# Patient Record
Sex: MALE | Race: WHITE | NOT HISPANIC OR LATINO | URBAN - METROPOLITAN AREA
[De-identification: names, ages, dates, MRNs, and addresses within clinical notes are randomized per-mention and may not be internally consistent; named-entity substitution may affect disease eponyms.]

---

## 2023-11-28 ENCOUNTER — HOSPITAL ENCOUNTER (OUTPATIENT)
Dept: RADIOLOGY | Facility: HOSPITAL | Age: 63
Discharge: HOME | End: 2023-11-28
Payer: OTHER GOVERNMENT

## 2023-11-28 ENCOUNTER — OFFICE VISIT (OUTPATIENT)
Dept: INTERNAL MEDICINE | Facility: CLINIC | Age: 63
End: 2023-11-28
Payer: OTHER GOVERNMENT

## 2023-11-28 VITALS
HEART RATE: 75 BPM | TEMPERATURE: 98 F | RESPIRATION RATE: 18 BRPM | SYSTOLIC BLOOD PRESSURE: 110 MMHG | WEIGHT: 229.06 LBS | OXYGEN SATURATION: 98 % | HEIGHT: 72 IN | BODY MASS INDEX: 31.03 KG/M2 | DIASTOLIC BLOOD PRESSURE: 78 MMHG

## 2023-11-28 DIAGNOSIS — I10 PRIMARY HYPERTENSION: ICD-10-CM

## 2023-11-28 DIAGNOSIS — M17.0 PRIMARY OSTEOARTHRITIS OF BOTH KNEES: ICD-10-CM

## 2023-11-28 DIAGNOSIS — E78.5 HYPERLIPIDEMIA, UNSPECIFIED HYPERLIPIDEMIA TYPE: ICD-10-CM

## 2023-11-28 DIAGNOSIS — I73.9 CLAUDICATION: ICD-10-CM

## 2023-11-28 DIAGNOSIS — Z00.00 ANNUAL PHYSICAL EXAM: Primary | ICD-10-CM

## 2023-11-28 DIAGNOSIS — Z12.11 COLON CANCER SCREENING: ICD-10-CM

## 2023-11-28 DIAGNOSIS — R73.03 PREDIABETES: ICD-10-CM

## 2023-11-28 DIAGNOSIS — J45.20 MILD INTERMITTENT ASTHMA, UNSPECIFIED WHETHER COMPLICATED: ICD-10-CM

## 2023-11-28 DIAGNOSIS — E66.9 OBESITY (BMI 30-39.9): ICD-10-CM

## 2023-11-28 DIAGNOSIS — Z12.5 PROSTATE CANCER SCREENING: ICD-10-CM

## 2023-11-28 DIAGNOSIS — I25.10 CORONARY ARTERY DISEASE, UNSPECIFIED VESSEL OR LESION TYPE, UNSPECIFIED WHETHER ANGINA PRESENT, UNSPECIFIED WHETHER NATIVE OR TRANSPLANTED HEART: ICD-10-CM

## 2023-11-28 PROCEDURE — 73562 XR KNEE ORTHO BILAT: ICD-10-PCS | Mod: 26,50,, | Performed by: RADIOLOGY

## 2023-11-28 PROCEDURE — 73562 X-RAY EXAM OF KNEE 3: CPT | Mod: 26,50,, | Performed by: RADIOLOGY

## 2023-11-28 PROCEDURE — 99999 PR PBB SHADOW E&M-NEW PATIENT-LVL V: ICD-10-PCS | Mod: PBBFAC,,,

## 2023-11-28 PROCEDURE — 99999 PR PBB SHADOW E&M-NEW PATIENT-LVL V: CPT | Mod: PBBFAC,,,

## 2023-11-28 PROCEDURE — 73562 X-RAY EXAM OF KNEE 3: CPT | Mod: TC,50

## 2023-11-28 PROCEDURE — 99397 PR PREVENTIVE VISIT,EST,65 & OVER: ICD-10-PCS | Mod: S$GLB,,,

## 2023-11-28 PROCEDURE — 99397 PER PM REEVAL EST PAT 65+ YR: CPT | Mod: S$GLB,,,

## 2023-11-28 RX ORDER — ASPIRIN 325 MG
TABLET, DELAYED RELEASE (ENTERIC COATED) ORAL
COMMUNITY

## 2023-11-28 RX ORDER — IBUPROFEN 100 MG/5ML
1000 SUSPENSION, ORAL (FINAL DOSE FORM) ORAL
COMMUNITY

## 2023-11-28 RX ORDER — CHLORTHALIDONE 25 MG/1
25 TABLET ORAL DAILY
Qty: 90 TABLET | Refills: 3 | Status: SHIPPED | OUTPATIENT
Start: 2023-11-28

## 2023-11-28 RX ORDER — FERROUS GLUCONATE 256(28)MG
TABLET ORAL
COMMUNITY

## 2023-11-28 RX ORDER — CETIRIZINE HYDROCHLORIDE 10 MG/1
TABLET ORAL
COMMUNITY
Start: 2023-02-18

## 2023-11-28 RX ORDER — ZINC GLUCONATE 50 MG
50 TABLET ORAL
COMMUNITY
Start: 2018-09-18

## 2023-11-28 RX ORDER — ATORVASTATIN CALCIUM 80 MG/1
80 TABLET, FILM COATED ORAL DAILY
Qty: 90 TABLET | Refills: 3 | Status: SHIPPED | OUTPATIENT
Start: 2023-11-28

## 2023-11-28 RX ORDER — BECLOMETHASONE DIPROPIONATE HFA 80 UG/1
1 AEROSOL, METERED RESPIRATORY (INHALATION) DAILY
Qty: 30.6 G | Refills: 3 | Status: SHIPPED | OUTPATIENT
Start: 2023-11-28

## 2023-11-28 RX ORDER — ATORVASTATIN CALCIUM 80 MG/1
80 TABLET, FILM COATED ORAL
COMMUNITY
End: 2023-11-28 | Stop reason: SDUPTHER

## 2023-11-28 RX ORDER — UBIDECARENONE 30 MG
30 CAPSULE ORAL
COMMUNITY
Start: 2020-10-01

## 2023-11-28 RX ORDER — PNV NO.95/FERROUS FUM/FOLIC AC 28MG-0.8MG
50 TABLET ORAL
COMMUNITY

## 2023-11-28 RX ORDER — CHLORTHALIDONE 25 MG/1
25 TABLET ORAL
COMMUNITY
End: 2023-11-28 | Stop reason: SDUPTHER

## 2023-11-28 NOTE — PROGRESS NOTES
Jonas Mateo  11/28/2023  [id number]    No, Primary Doctor  Patient Care Team:  No, Primary Doctor as PCP - General          Visit Type:an urgent visit for a new problem    Chief Complaint:  Chief Complaint   Patient presents with    Establish Care     Recently moved here. Needs med refills.   Pain in legs to the point of needing to sit down  Would like referral to Ortho  Full panel of blood work to know baseline      History of Present Illness:    The patient presents today for an establish care appointment. It was explained to the patient that I could not be their Primary Care Provider and that I will my have office staff to schedule an establish care appointment with my collaborating physician, Dr. Lala Roa at a later time. The patient was in agreement with scheduling an establish care appointment with  at later time.      Recently moved to the area and needs new PCP     History of Pre-DM  Took metformin before. Has not taken it in years     Has history of asthma/restrictive lung disease  Has not used rescue inhaler in a year  Takes Flovent daily  Insurance would like for him to try a new medicine     Has pain in legs  Has bilateral knee pain  If he sits for 45 mins, pain when he stands    He has pain in his bilateral thighs     Friends is orthopedic surgeon      History:  No past medical history on file.  No past surgical history on file.  No family history on file.  Social History     Socioeconomic History    Marital status:      Social Determinants of Health     Financial Resource Strain: Low Risk  (11/28/2023)    Overall Financial Resource Strain (CARDIA)     Difficulty of Paying Living Expenses: Not hard at all   Food Insecurity: No Food Insecurity (11/28/2023)    Hunger Vital Sign     Worried About Running Out of Food in the Last Year: Never true     Ran Out of Food in the Last Year: Never true   Transportation Needs: No Transportation Needs (11/28/2023)    PRAPARE - Transportation      Lack of Transportation (Medical): No     Lack of Transportation (Non-Medical): No   Physical Activity: Sufficiently Active (11/28/2023)    Exercise Vital Sign     Days of Exercise per Week: 5 days     Minutes of Exercise per Session: 30 min   Stress: No Stress Concern Present (11/28/2023)    [place] of [employer] - Occupational Stress Questionnaire     Feeling of Stress : Not at all   Social Connections: Unknown (11/28/2023)    Social Connection and Isolation Panel [NHANES]     Frequency of Communication with Friends and Family: More than three times a week     Frequency of Social Gatherings with Friends and Family: More than three times a week     Active Member of Clubs or Organizations: No     Attends Club or Organization Meetings: Never     Marital Status:    Housing Stability: Unknown (11/28/2023)    Housing Stability Vital Sign     Number of Places Lived in the Last Year: 2     Unstable Housing in the Last Year: No     There is no problem list on file for this patient.    Review of patient's allergies indicates:  Not on File    The following were reviewed at this visit: active problem list, medication list, allergies, family history, social history, and health maintenance.    Medications:  No current outpatient medications on file prior to visit.     No current facility-administered medications on file prior to visit.       Medications have been reviewed and reconciled with patient at this visit.  Barriers to medications reviewed with patient.    Adverse reactions to current medications reviewed with patient..    Over the counter medications reviewed and reconciled with patient.    Exam:  Wt Readings from Last 3 Encounters:   No data found for Wt     Temp Readings from Last 3 Encounters:   No data found for Temp     BP Readings from Last 3 Encounters:   No data found for BP     Pulse Readings from Last 3 Encounters:   No data found for Pulse     There is no height or weight on file to  "calculate BMI.      Review of Systems   Cardiovascular:  Negative for chest pain and palpitations.     Physical Exam  Vitals and nursing note reviewed.   Constitutional:       General: He is not in acute distress.     Appearance: He is well-developed. He is not diaphoretic.   HENT:      Head: Normocephalic and atraumatic.      Right Ear: External ear normal.      Left Ear: External ear normal.   Eyes:      General:         Right eye: No discharge.         Left eye: No discharge.      Conjunctiva/sclera: Conjunctivae normal.      Pupils: Pupils are equal, round, and reactive to light.   Cardiovascular:      Rate and Rhythm: Normal rate and regular rhythm.      Heart sounds: Normal heart sounds. No murmur heard.  Pulmonary:      Effort: Pulmonary effort is normal. No respiratory distress.      Breath sounds: Normal breath sounds. No wheezing.   Neurological:      Mental Status: He is alert and oriented to person, place, and time.   Psychiatric:         Behavior: Behavior normal.         Thought Content: Thought content normal.         Judgment: Judgment normal.         Laboratory Reviewed ({N/A)  No results found for: "WBC", "HGB", "HCT", "PLT", "CHOL", "TRIG", "HDL", "LDLDIRECT", "ALT", "AST", "NA", "K", "CL", "CREATININE", "BUN", "CO2", "TSH", "PSA", "INR", "GLUF", "HGBA1C", "MICROALBUR"    Jonas was seen today for establish care.    Diagnoses and all orders for this visit:    Annual physical exam  -     CBC Auto Differential; Future  -     Lipid Panel; Future  -     Comprehensive Metabolic Panel; Future    Prostate cancer screening  -     PSA, SCREENING; Future    Prediabetes  -     Hemoglobin A1C; Future    Obesity (BMI 30-39.9)    Mild intermittent asthma, unspecified whether complicated  -     beclomethasone (QVAR REDIHALER) 80 mcg/actuation inhaler; Inhale 1 puff into the lungs once daily.    Primary hypertension  -     chlorthalidone (HYGROTEN) 25 MG Tab; Take 1 tablet (25 mg total) by mouth once " daily.    Hyperlipidemia, unspecified hyperlipidemia type  -     atorvastatin (LIPITOR) 80 MG tablet; Take 1 tablet (80 mg total) by mouth once daily.  -     Ambulatory referral/consult to Cardiology; Future    Colon cancer screening  -     Cologuard Screening (Multitarget Stool DNA); Future  -     Cologuard Screening (Multitarget Stool DNA)    Coronary artery disease, unspecified vessel or lesion type, unspecified whether angina present, unspecified whether native or transplanted heart  -     Ambulatory referral/consult to Cardiology; Future    Claudication  -     Ambulatory referral/consult to Cardiology; Future    Primary osteoarthritis of both knees  -     X-ray Knee Ortho Bilateral; Future  -     Ambulatory referral/consult to Orthopedics; Future      Will schedule an establish care appointment with Dr. Roa in  6 months  Labs today   Refer to ortho and cards       Care Plan/Goals: Reviewed    Goals    None         Follow up: No follow-ups on file.    After visit summary was printed and given to patient upon discharge today.  Patient goals and care plan are included in After Visit Summary.

## 2023-11-29 ENCOUNTER — OFFICE VISIT (OUTPATIENT)
Dept: CARDIOLOGY | Facility: CLINIC | Age: 63
End: 2023-11-29
Payer: OTHER GOVERNMENT

## 2023-11-29 ENCOUNTER — HOSPITAL ENCOUNTER (OUTPATIENT)
Dept: CARDIOLOGY | Facility: HOSPITAL | Age: 63
Discharge: HOME | End: 2023-11-29
Attending: STUDENT IN AN ORGANIZED HEALTH CARE EDUCATION/TRAINING PROGRAM
Payer: OTHER GOVERNMENT

## 2023-11-29 VITALS
BODY MASS INDEX: 31.08 KG/M2 | HEIGHT: 72 IN | HEART RATE: 80 BPM | OXYGEN SATURATION: 97 % | SYSTOLIC BLOOD PRESSURE: 110 MMHG | DIASTOLIC BLOOD PRESSURE: 70 MMHG | WEIGHT: 229.5 LBS

## 2023-11-29 DIAGNOSIS — M25.561 CHRONIC PAIN OF BOTH KNEES: ICD-10-CM

## 2023-11-29 DIAGNOSIS — I25.10 CORONARY ARTERY DISEASE, UNSPECIFIED VESSEL OR LESION TYPE, UNSPECIFIED WHETHER ANGINA PRESENT, UNSPECIFIED WHETHER NATIVE OR TRANSPLANTED HEART: ICD-10-CM

## 2023-11-29 DIAGNOSIS — E66.9 OBESITY (BMI 30.0-34.9): ICD-10-CM

## 2023-11-29 DIAGNOSIS — Z76.89 ENCOUNTER TO ESTABLISH CARE: Primary | ICD-10-CM

## 2023-11-29 DIAGNOSIS — R73.03 PREDIABETES: ICD-10-CM

## 2023-11-29 DIAGNOSIS — Z76.89 ENCOUNTER TO ESTABLISH CARE: ICD-10-CM

## 2023-11-29 DIAGNOSIS — M79.605 PAIN IN BOTH LOWER EXTREMITIES: ICD-10-CM

## 2023-11-29 DIAGNOSIS — I10 HYPERTENSION, UNSPECIFIED TYPE: Primary | ICD-10-CM

## 2023-11-29 DIAGNOSIS — Z00.00 ROUTINE HEALTH MAINTENANCE: ICD-10-CM

## 2023-11-29 DIAGNOSIS — J45.909 ASTHMA, UNSPECIFIED ASTHMA SEVERITY, UNSPECIFIED WHETHER COMPLICATED, UNSPECIFIED WHETHER PERSISTENT: ICD-10-CM

## 2023-11-29 DIAGNOSIS — M79.604 PAIN IN BOTH LOWER EXTREMITIES: ICD-10-CM

## 2023-11-29 DIAGNOSIS — M25.562 CHRONIC PAIN OF BOTH KNEES: ICD-10-CM

## 2023-11-29 DIAGNOSIS — E78.5 HYPERLIPIDEMIA, UNSPECIFIED HYPERLIPIDEMIA TYPE: ICD-10-CM

## 2023-11-29 DIAGNOSIS — G89.29 CHRONIC PAIN OF BOTH KNEES: ICD-10-CM

## 2023-11-29 DIAGNOSIS — I73.9 CLAUDICATION: ICD-10-CM

## 2023-11-29 PROBLEM — E66.811 OBESITY (BMI 30.0-34.9): Status: ACTIVE | Noted: 2023-11-29

## 2023-11-29 PROCEDURE — 99999 PR PBB SHADOW E&M-EST. PATIENT-LVL IV: ICD-10-PCS | Mod: PBBFAC,,, | Performed by: STUDENT IN AN ORGANIZED HEALTH CARE EDUCATION/TRAINING PROGRAM

## 2023-11-29 PROCEDURE — 93010 ELECTROCARDIOGRAM REPORT: CPT | Mod: ,,, | Performed by: STUDENT IN AN ORGANIZED HEALTH CARE EDUCATION/TRAINING PROGRAM

## 2023-11-29 PROCEDURE — 99204 OFFICE O/P NEW MOD 45 MIN: CPT | Mod: S$GLB,,, | Performed by: STUDENT IN AN ORGANIZED HEALTH CARE EDUCATION/TRAINING PROGRAM

## 2023-11-29 PROCEDURE — 99204 PR OFFICE/OUTPT VISIT, NEW, LEVL IV, 45-59 MIN: ICD-10-PCS | Mod: S$GLB,,, | Performed by: STUDENT IN AN ORGANIZED HEALTH CARE EDUCATION/TRAINING PROGRAM

## 2023-11-29 PROCEDURE — 93010 EKG 12-LEAD: ICD-10-PCS | Mod: ,,, | Performed by: STUDENT IN AN ORGANIZED HEALTH CARE EDUCATION/TRAINING PROGRAM

## 2023-11-29 PROCEDURE — 99999 PR PBB SHADOW E&M-EST. PATIENT-LVL IV: CPT | Mod: PBBFAC,,, | Performed by: STUDENT IN AN ORGANIZED HEALTH CARE EDUCATION/TRAINING PROGRAM

## 2023-11-29 PROCEDURE — 93005 ELECTROCARDIOGRAM TRACING: CPT

## 2023-11-29 NOTE — PROGRESS NOTES
Section of Cardiology                  Cardiac Clinic Note    Chief Complaint/Reason for consultation:  leg pain      HPI:   Jonas Galindo is a 63 y.o. male with h/o CAD s/p PCI LAD x2 9/2020, pre diabetes, obesity, hypertension, HLD who was referred to cardiology clinic by ANASTASIA Mariscal for evaluation.    11/29/23  Moved from [place] 6m ago  Has been having claudication for about a few months  When he sitting or walks, hamstrings hurt with radiation to middle thigh  Had normal arterial U/S legs 7/22 was normal   Feels like he may have restless leg   Works form home   Used to exercise, but not much due to knee pain    Has been taking 40 mg of lipitor, prior was 80 mg  Triglycerides are high  Diet has changed since moving to [facility] weight has been stable for 4-5 yrs now    BP stable   Chewed tobacco, stopped 20 years ago  2 beers, glass wine weekly    Family hx: mom- heart disease       EKG 11/29/23 NSR, nonspecific ST wave       ECHO  No results found for this or any previous visit.       STRESS TEST No results found for this or any previous visit.       LHC No results found for this or any previous visit.            ROS: All 10 systems reviewed. Please refer to the HPI for pertinent positives. All other systems negative.     Past Medical History  Past Medical History:   Diagnosis Date    Heart disease     Had 2 stents placed       Surgical History  Past Surgical History:   Procedure Laterality Date    SPINE SURGERY  2015    TOTAL SHOULDER ARTHROPLASTY Right           Allergies:   Review of patient's allergies indicates:   Allergen Reactions    Oseltamivir Palpitations and Rash       Social History:  Social History     Socioeconomic History    Marital status:    Tobacco Use    Smoking status: Never    Smokeless tobacco: Former     Types: Chew   Substance and Sexual Activity    Alcohol use: Not Currently    Drug use: Never     Social Determinants of Health     Financial Resource Strain: Low  Risk  (11/28/2023)    Overall Financial Resource Strain (CARDIA)     Difficulty of Paying Living Expenses: Not hard at all   Food Insecurity: No Food Insecurity (11/28/2023)    Hunger Vital Sign     Worried About Running Out of Food in the Last Year: Never true     Ran Out of Food in the Last Year: Never true   Transportation Needs: No Transportation Needs (11/28/2023)    PRAPARE - Transportation     Lack of Transportation (Medical): No     Lack of Transportation (Non-Medical): No   Physical Activity: Sufficiently Active (11/28/2023)    Exercise Vital Sign     Days of Exercise per Week: 5 days     Minutes of Exercise per Session: 30 min   Stress: No Stress Concern Present (11/28/2023)    [employer] [employer]ational Health - Occupational Stress Questionnaire     Feeling of Stress : Not at all   Social Connections: Unknown (11/28/2023)    Social Connection and Isolation Panel [NHANES]     Frequency of Communication with Friends and Family: More than three times a week     Frequency of Social Gatherings with Friends and Family: More than three times a week     Active Member of Clubs or Organizations: No     Attends Club or Organization Meetings: Never     Marital Status:    Housing Stability: Unknown (11/28/2023)    Housing Stability Vital Sign     Number of Places Lived in the Last Year: 2     Unstable Housing in the Last Year: No       Family History:  family history is not on file.    Home Medications:  Current Outpatient Medications on File Prior to Visit   Medication Sig Dispense Refill    ascorbic acid, vitamin C, (VITAMIN C) 1000 MG tablet Take 1,000 mg by mouth.      aspirin (ASPIR-81 ORAL)       atorvastatin (LIPITOR) 80 MG tablet Take 1 tablet (80 mg total) by mouth once daily. 90 tablet 3    beclomethasone (QVAR REDIHALER) 80 mcg/actuation inhaler Inhale 1 puff into the lungs once daily. 30.6 g 3    cetirizine (ZYRTEC) 10 MG tablet TAKE 1 TABLET BY MOUTH DAILY, THEN TWICE A DAY       "chlorthalidone (HYGROTEN) 25 MG Tab Take 1 tablet (25 mg total) by mouth once daily. 90 tablet 3    cholecalciferol, vitamin D3, 1,250 mcg (50,000 unit) capsule Take by mouth.      co-enzyme Q-10 30 mg capsule Take 30 mg by mouth.      cyanocobalamin (VITAMIN B-12) 100 MCG tablet Take 50 mcg by mouth.      ferrous gluconate 256 mg (28 mg iron) Tab Take by mouth.      FLUTICASONE FUROATE NASL       multivitamin with minerals tablet Take 1 tablet by mouth once daily.      psyllium (HYDROCIL) packet Take 1 packet by mouth once daily.      zinc gluconate 50 mg tablet Take 50 mg by mouth.       No current facility-administered medications on file prior to visit.       Physical exam:  /70 (BP Location: Right arm, Patient Position: Sitting, BP Method: Large (Manual))   Pulse 80   Ht 6' (1.829 m)   Wt 104.1 kg (229 lb 8 oz)   SpO2 97%   BMI 31.13 kg/m²         General: Pt is a 63 y.o. year old male who is AAOx3, in NAD, is pleasant, well nourished, looks stated age  HEENT: PERRL, EOMI, Oral mucosa pink & moist  CVS  No abnormal cardiac pulsations noted on inspection. JVP not raised. The apical impulse is normal on palpation, and is located in the left 5th intercostal space in the mid - clavicular line. No palpable thrills or abnormal pulsations noted. RR, S1 - S2 heard, no murmurs, rubs or gallops appreciated.   PUL : CTA B/L. No wheezes/crackles heard   ABD : BS +, soft. No tenderness elicited   LE : No C/C/E. Distal Pulses palpable B/L         LABS:    Chemistry:   Lab Results   Component Value Date     11/28/2023    K 4.2 11/28/2023     11/28/2023    CO2 30 (H) 11/28/2023    BUN 14 11/28/2023    CREATININE 1.1 11/28/2023    CALCIUM 10.1 11/28/2023     Cardiac Markers: No results found for: "CKTOTAL", "CKMB", "CKMBINDEX", "TROPONINI"  Cardiac Markers (Last 3): No results found for: "CKTOTAL", "CKMB", "CKMBINDEX", "TROPONINI"  CBC:   Lab Results   Component Value Date    WBC 5.25 11/28/2023    HGB " "15.1 11/28/2023    HCT 44.8 11/28/2023    MCV 90 11/28/2023     11/28/2023     Lipids:   Lab Results   Component Value Date    CHOL 178 11/28/2023    TRIG 328 (H) 11/28/2023    HDL 38 (L) 11/28/2023     Coagulation: No results found for: "PT", "INR", "APTT"        Assessment        1. Hypertension, unspecified type    2. Hyperlipidemia, unspecified hyperlipidemia type    3. Coronary artery disease, unspecified vessel or lesion type, unspecified whether angina present, unspecified whether native or transplanted heart    4. Claudication    5. Obesity (BMI 30.0-34.9)    6. Chronic pain of both knees    7. Prediabetes    8. Asthma, unspecified asthma severity, unspecified whether complicated, unspecified whether persistent         Plan:      Leg pain  ?cramps  Will check CK level  Continue lipitor for now  Had arterial u/s 7/22 with no significant blockages in legs, normal ABIs    Hypertension  Stable   Continue chlorthalidone    CAD  s/p PCI LAD 9/2020  Denies chest pain   Continue aspirin, statin    Asthma  Stable   Takes intermittent inhalers    Prediabetes   A1c 5.7   Continue therapy    Knee pain  Will see ortho  Unable to exercise due to knee pain    HLD  LDL 74 as 11/23    - diet and exercise recommended   Continue statin  Recheck in 3 m    Obesity, Body mass index is 31.13 kg/m².   Low salt, low fat diet  Exercise as tolerated, at least 30 min daily     This note was prepared using voice recognition system and is likely to have sound alike errors that may have been overlooked even after proofreading.     I have reviewed all pertinent chart information.  Plans and recommendations have been formulated under my direct supervision. All questions answered and patient voiced understanding.   If symptoms persist go to the ED.    RTC in         Adriana Olmstead MD  Cardiology          "

## 2023-11-30 ENCOUNTER — LAB VISIT (OUTPATIENT)
Dept: LAB | Facility: HOSPITAL | Age: 63
End: 2023-11-30
Attending: STUDENT IN AN ORGANIZED HEALTH CARE EDUCATION/TRAINING PROGRAM
Payer: OTHER GOVERNMENT

## 2023-11-30 DIAGNOSIS — M79.604 PAIN IN BOTH LOWER EXTREMITIES: ICD-10-CM

## 2023-11-30 DIAGNOSIS — M79.605 PAIN IN BOTH LOWER EXTREMITIES: ICD-10-CM

## 2023-11-30 PROCEDURE — 36415 COLL VENOUS BLD VENIPUNCTURE: CPT | Performed by: STUDENT IN AN ORGANIZED HEALTH CARE EDUCATION/TRAINING PROGRAM

## 2023-11-30 PROCEDURE — 82550 ASSAY OF CK (CPK): CPT | Performed by: STUDENT IN AN ORGANIZED HEALTH CARE EDUCATION/TRAINING PROGRAM

## 2023-12-01 LAB — CK SERPL-CCNC: 102 U/L (ref 20–200)

## 2023-12-04 ENCOUNTER — PATIENT MESSAGE (OUTPATIENT)
Dept: INTERNAL MEDICINE | Facility: CLINIC | Age: 63
End: 2023-12-04
Payer: OTHER GOVERNMENT

## 2023-12-18 ENCOUNTER — OFFICE VISIT (OUTPATIENT)
Dept: ORTHOPEDICS | Facility: CLINIC | Age: 63
End: 2023-12-18
Payer: OTHER GOVERNMENT

## 2023-12-18 VITALS
SYSTOLIC BLOOD PRESSURE: 128 MMHG | DIASTOLIC BLOOD PRESSURE: 81 MMHG | WEIGHT: 229 LBS | HEIGHT: 72 IN | BODY MASS INDEX: 31.02 KG/M2 | HEART RATE: 63 BPM

## 2023-12-18 DIAGNOSIS — M17.0 PRIMARY OSTEOARTHRITIS OF BOTH KNEES: Primary | ICD-10-CM

## 2023-12-18 PROCEDURE — 99203 PR OFFICE/OUTPT VISIT, NEW, LEVL III, 30-44 MIN: ICD-10-PCS | Mod: 25,S$GLB,, | Performed by: PHYSICIAN ASSISTANT

## 2023-12-18 PROCEDURE — 99999 PR PBB SHADOW E&M-EST. PATIENT-LVL V: ICD-10-PCS | Mod: PBBFAC,,, | Performed by: PHYSICIAN ASSISTANT

## 2023-12-18 PROCEDURE — 20610 DRAIN/INJ JOINT/BURSA W/O US: CPT | Mod: 50,S$GLB,, | Performed by: PHYSICIAN ASSISTANT

## 2023-12-18 PROCEDURE — 20610 LARGE JOINT ASPIRATION/INJECTION: BILATERAL KNEE: ICD-10-PCS | Mod: 50,S$GLB,, | Performed by: PHYSICIAN ASSISTANT

## 2023-12-18 PROCEDURE — 99203 OFFICE O/P NEW LOW 30 MIN: CPT | Mod: 25,S$GLB,, | Performed by: PHYSICIAN ASSISTANT

## 2023-12-18 PROCEDURE — 99999 PR PBB SHADOW E&M-EST. PATIENT-LVL V: CPT | Mod: PBBFAC,,, | Performed by: PHYSICIAN ASSISTANT

## 2023-12-18 RX ORDER — LIDOCAINE HYDROCHLORIDE 10 MG/ML
5 INJECTION INFILTRATION; PERINEURAL
Status: DISCONTINUED | OUTPATIENT
Start: 2023-12-18 | End: 2023-12-18 | Stop reason: HOSPADM

## 2023-12-18 RX ORDER — METHYLPREDNISOLONE ACETATE 80 MG/ML
80 INJECTION, SUSPENSION INTRA-ARTICULAR; INTRALESIONAL; INTRAMUSCULAR; SOFT TISSUE
Status: DISCONTINUED | OUTPATIENT
Start: 2023-12-18 | End: 2023-12-18 | Stop reason: HOSPADM

## 2023-12-18 RX ADMIN — LIDOCAINE HYDROCHLORIDE 5 ML: 10 INJECTION INFILTRATION; PERINEURAL at 08:12

## 2023-12-18 RX ADMIN — METHYLPREDNISOLONE ACETATE 80 MG: 80 INJECTION, SUSPENSION INTRA-ARTICULAR; INTRALESIONAL; INTRAMUSCULAR; SOFT TISSUE at 08:12

## 2023-12-18 NOTE — PROGRESS NOTES
Patient ID: Jonas Galindo is a 63 y.o. male.    Chief Complaint: Pain and Swelling of the Left Knee and Pain and Swelling of the Right Knee      HPI: Jonas Galindo  is a 63 y.o. male who c/o Pain and Swelling of the Left Knee and Pain and Swelling of the Right Knee       Patient is a new patient who presents to me today with bilateral knee pain, left greater than right.  The patient states pain has been ongoing for roughly 20 years but increasing here over the last year as he has been slightly less active.  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  He states he would prefer not to take any medications is inclined with vitamins.  He states pain increases with use and activity over the course of the day has a hard time walking long distances, going from a sitting to standing or standing to seated position, unlevel terrain or stairs.  He has not using any devices to assist with ambulation.  He does have neoprene knee braces should he need.  He has not had any previous injections into the knees.    He denies any trauma or falls   Patient is presently denying any shortness of breath, chest pain, fever/chills, nausea/vomiting, loss of taste or smell, numbness/tingling or sensation changes, loss of bladder or bowel function.    Past Medical History:   Diagnosis Date    Heart disease     Had 2 stents placed       Past Surgical History:   Procedure Laterality Date    SPINE SURGERY  2015    TOTAL SHOULDER ARTHROPLASTY Right        History reviewed. No pertinent family history.    Social History     Socioeconomic History    Marital status:    Tobacco Use    Smoking status: Never    Smokeless tobacco: Former     Types: Chew   Substance and Sexual Activity    Alcohol use: Not Currently    Drug use: Never     Social Determinants of Health     Financial Resource Strain: Low Risk  (11/28/2023)    Overall Financial Resource Strain (CARDI)     Difficulty of Paying Living  Expenses: Not hard at all   Food Insecurity: No Food Insecurity (11/28/2023)    Hunger Vital Sign     Worried About Running Out of Food in the Last Year: Never true     Ran Out of Food in the Last Year: Never true   Transportation Needs: No Transportation Needs (11/28/2023)    PRAPARE - Transportation     Lack of Transportation (Medical): No     Lack of Transportation (Non-Medical): No   Physical Activity: Sufficiently Active (11/28/2023)    Exercise Vital Sign     Days of Exercise per Week: 5 days     Minutes of Exercise per Session: 30 min   Stress: No Stress Concern Present (11/28/2023)    [place] of Occupational Health - Occupational Stress Questionnaire     Feeling of Stress : Not at all   Social Connections: Unknown (11/28/2023)    Social Connection and Isolation Panel [NHANES]     Frequency of Communication with Friends and Family: More than three times a week     Frequency of Social Gatherings with Friends and Family: More than three times a week     Active Member of Clubs or Organizations: No     Attends Club or Organization Meetings: Never     Marital Status:    Housing Stability: Unknown (11/28/2023)    Housing Stability Vital Sign     Number of Places Lived in the Last Year: 2     Unstable Housing in the Last Year: No       Medication List with Changes/Refills   Current Medications    ASCORBIC ACID, VITAMIN C, (VITAMIN C) 1000 MG TABLET    Take 1,000 mg by mouth.    ASPIRIN (ASPIR-81 ORAL)        ATORVASTATIN (LIPITOR) 80 MG TABLET    Take 1 tablet (80 mg total) by mouth once daily.    BECLOMETHASONE (QVAR REDIHALER) 80 MCG/ACTUATION INHALER    Inhale 1 puff into the lungs once daily.    CETIRIZINE (ZYRTEC) 10 MG TABLET    TAKE 1 TABLET BY MOUTH DAILY, THEN TWICE A DAY    CHLORTHALIDONE (HYGROTEN) 25 MG TAB    Take 1 tablet (25 mg total) by mouth once daily.    CHOLECALCIFEROL, VITAMIN D3, 1,250 MCG (50,000 UNIT) CAPSULE    Take by mouth.    CO-ENZYME Q-10 30 MG CAPSULE    Take 30 mg by  mouth.    CYANOCOBALAMIN (VITAMIN B-12) 100 MCG TABLET    Take 50 mcg by mouth.    FERROUS GLUCONATE 256 MG (28 MG IRON) TAB    Take by mouth.    FLUTICASONE FUROATE NASL        MULTIVITAMIN WITH MINERALS TABLET    Take 1 tablet by mouth once daily.    PSYLLIUM (HYDROCIL) PACKET    Take 1 packet by mouth once daily.    ZINC GLUCONATE 50 MG TABLET    Take 50 mg by mouth.       Review of patient's allergies indicates:   Allergen Reactions    Oseltamivir Palpitations and Rash         Objective:      Lower Extremity      Left KNEE:  ROM: passive flex/ ext full  Patella midling, mild to moderate crepitus noted   Ligaments stable  Pain on palpation to medial and lateral  Calf NT, soft   No defect in the patella or quadriceps tendon  Patella tracks midline no dislocation noted  (-) Dustin sign  DF/PF full  Wiggles toes  Sensation intact to light touch   No pitting edema appreciated   NVI  Cap refill < 2 sec  Skin warm to touch, no obvious lesion noted     Right KNEE:  ROM: passive flex/ ext full  Patella midling, mild to moderate crepitus noted   No defect in the patellar quadriceps tendon  Ligaments stable  Pain on palpation to medial and lateral   Mild edema present  Calf NT, soft  (-) Dustin sign  DF/PF full  Wiggles toes  Sensation intact to light touch   No pitting edema appreciated   NVI  Cap refill < 2 sec  Skin warm to touch, no obvious lesion noted       IMAGING:    XRAY:  FINDINGS:     Right knee, 3 views: Marked medial joint space narrowing.  Tricompartmental marginal spurring lateral tilt of the patella on the sunrise view.  No significant joint effusion.  Nonspecific periarticular calcifications noted adjacent to the medial aspect of the joint.  No fractures or dislocations.  No erosions.     Left knee, 3 views: Marked medial joint space narrowing.  Spurring of the patellofemoral compartment.  Lateral tilt of the patella.  No erosions.  No fractures.        Impression:   Degenerative joint disease with marked  medial joint space narrowing bilaterally.  Lateral tilt of the patella bilaterally.    Kellgren Rambo scale : 3         Assessment:       Encounter Diagnosis   Name Primary?    Primary osteoarthritis of both knees           Plan:       Jonas was seen today for pain, swelling, pain and swelling.    Diagnoses and all orders for this visit:    Primary osteoarthritis of both knees  -     Ambulatory referral/consult to Orthopedics        Jonas Galindo is a new patient who presents to me today with chief complaint of bilateral knee pain, left greater than right. We had a long discussion today regarding degenerative arthritis in the knees. The patient understands that arthritis is chronic and will worsen over time.  The patient also understands that arthritis may cause episodic flare-ups in pain. Management or if arthritis is achieved through a multi-modal approach including weight loss in obese individuals, activity modification, NSAIDs (topical vs oral) where appropriate, periodic intra-articular steroid injections, viscosupplementation, physical therapy, knee bracing, ambulatory aids, as well as geniculate nerve blocks.  I would like to recommend the following vitamins to add to his regimen:  Super B, turmeric, glucosamine and chondroitin.  The patient does have neoprene sleeve should he need he may call if he would prefer bar hinged knee braces for further support.  At this time he would like to hold off on any NSAIDs although we did discuss adding this to his regimen in the future.  He elected to proceed with short-acting steroid injections into both knees today.  Ask that he refrain from soaking in standing water such as a pool or tub for 24 hours.  He may advance activity as tolerated with elevation and ice as needed.  We discussed that these injections may be repeated every 3 months.  I would like to see him back in this timeframe.  If he is doing well he may cancel this appointment.  Furthermore if he is  not he may reach out to our office to add NSAIDs or discuss moving forward with gel injections.    Dr. Menjivar is aware of the patient & current presentation. He agrees with the current plan above.     Patient verbalized understanding of all instructions and agreed with the above plan.    No follow-ups on file.    The patient understands, chooses and consents to this plan and accepts all   the risks which include but are not limited to the risks mentioned above.     Disclaimer: This note was prepared using a voice recognition system and is likely to have sound alike errors within the text.

## 2023-12-18 NOTE — PROCEDURES
Large Joint Aspiration/Injection: bilateral knee    Date/Time: 12/18/2023 8:00 AM    Performed by: Danielle Chadwick PA  Authorized by: Danielle Chadwick PA    Consent Done?:  Yes (Verbal)  Indications:  Arthritis, joint swelling and pain  Site marked: the procedure site was marked      Local anesthesia used?: Yes    Local anesthetic:  Topical anesthetic    Details:  Needle Size:  22 G  Approach:  Anterolateral  Location:  Knee  Laterality:  Bilateral  Site:  Bilateral knee  Medications (Right):  5 mL LIDOcaine HCL 10 mg/ml (1%) 10 mg/mL (1 %); 80 mg methylPREDNISolone acetate 80 mg/mL  Medications (Left):  5 mL LIDOcaine HCL 10 mg/ml (1%) 10 mg/mL (1 %); 80 mg methylPREDNISolone acetate 80 mg/mL  Patient tolerance:  Patient tolerated the procedure well with no immediate complications     Verbal consent was obtained  The patient's ID, site, side was verified  The site was sterile prepped in standard fashion  The injection was performed in the amy-lateral side without complication  A sterile Band-Aid was applied    Patient was directed to apply ice today at roughly 15 minutes at a time as needed.  It was discussed that they may be sore for the next few days or so.  Please avoid strenuous activity over the next 24 hours.  It was also discussed that the patient may have a increase in glucose if diabetic and should monitor levels.  Patient was instructed to call as needed.

## 2024-02-02 ENCOUNTER — PATIENT MESSAGE (OUTPATIENT)
Dept: CARDIOLOGY | Facility: CLINIC | Age: 64
End: 2024-02-02
Payer: OTHER GOVERNMENT

## 2024-02-02 ENCOUNTER — TELEPHONE (OUTPATIENT)
Dept: CARDIOLOGY | Facility: CLINIC | Age: 64
End: 2024-02-02
Payer: OTHER GOVERNMENT

## 2024-02-22 ENCOUNTER — TELEPHONE (OUTPATIENT)
Dept: ORTHOPEDICS | Facility: CLINIC | Age: 64
End: 2024-02-22
Payer: OTHER GOVERNMENT

## 2024-02-22 DIAGNOSIS — M17.0 PRIMARY OSTEOARTHRITIS OF BOTH KNEES: Primary | ICD-10-CM

## 2024-02-22 NOTE — TELEPHONE ENCOUNTER
Called the patient in regards to their appointment at 12:00 with Danielle Chadwick PA-C. Left a message letting the patient know it is to soon for this appointment since they just had an injection in December.

## 2024-02-26 ENCOUNTER — TELEPHONE (OUTPATIENT)
Dept: ORTHOPEDICS | Facility: CLINIC | Age: 64
End: 2024-02-26
Payer: OTHER GOVERNMENT

## 2024-02-26 DIAGNOSIS — M17.0 PRIMARY OSTEOARTHRITIS OF BOTH KNEES: Primary | ICD-10-CM

## 2024-03-13 ENCOUNTER — TELEPHONE (OUTPATIENT)
Dept: ORTHOPEDICS | Facility: CLINIC | Age: 64
End: 2024-03-13
Payer: OTHER GOVERNMENT

## 2024-03-13 NOTE — TELEPHONE ENCOUNTER
Returned the patient's phone call in their message. Patient states that they would like to reschedule their appointment to this week due to fact that they will be out of town next week. Informed the patient that I do not have any appointment's this week for Danielle Chadwick PA-C. Due to the fact that Danielle Farrukh WILKES is in surgery today, her schedule is booked for tomorrow, and she will be out of the office on Friday. Patient states that they will be back in town on next Friday but will be going back out of town on 03/25/24 until the end of the month. I got the patient reschedule for 04/04/24 at 11:45. Verbalized understanding.

## 2024-03-13 NOTE — TELEPHONE ENCOUNTER
----- Message from Laura Connors sent at 3/13/2024 12:29 PM CDT -----  Contact: Jonas Freeman called to corey andrea his 3/18 appt due to work travel. Is there any way he can be seen this week? Please call him back at [phone].    Thanks  TS

## 2024-04-04 ENCOUNTER — PROCEDURE VISIT (OUTPATIENT)
Dept: ORTHOPEDICS | Facility: CLINIC | Age: 64
End: 2024-04-04
Payer: OTHER GOVERNMENT

## 2024-04-04 VITALS — BODY MASS INDEX: 31.03 KG/M2 | HEIGHT: 72 IN | WEIGHT: 229.06 LBS

## 2024-04-04 DIAGNOSIS — M17.0 PRIMARY OSTEOARTHRITIS OF BOTH KNEES: Primary | ICD-10-CM

## 2024-04-04 PROCEDURE — 20610 DRAIN/INJ JOINT/BURSA W/O US: CPT | Mod: 50,S$GLB,, | Performed by: PHYSICIAN ASSISTANT

## 2024-04-04 PROCEDURE — 99499 UNLISTED E&M SERVICE: CPT | Mod: S$GLB,,, | Performed by: PHYSICIAN ASSISTANT

## 2024-04-04 NOTE — PROCEDURES
Large Joint Aspiration/Injection: bilateral knee    Date/Time: 4/4/2024 11:45 AM    Performed by: Danielle Chadwick PA  Authorized by: Danielle Chadwick PA    Consent Done?:  Yes (Verbal)  Indications:  Arthritis, joint swelling and pain  Site marked: the procedure site was marked      Local anesthesia used?: Yes    Local anesthetic:  Topical anesthetic    Details:  Needle Size:  21 G  Approach:  Anterolateral  Location:  Knee  Laterality:  Bilateral  Site:  Bilateral knee  Medications (Right):  60 mg hyaluronate sodium, stabilized (DUROLANE) 60 mg/3 mL  Medications (Left):  60 mg hyaluronate sodium, stabilized (DUROLANE) 60 mg/3 mL  Patient tolerance:  Patient tolerated the procedure well with no immediate complications     Verbal consent was obtained  The patient's ID, site, side was verified  The site was sterile prepped in standard fashion  The injection was performed in the amy-lateral side without complication  A sterile Band-Aid was applied    Patient was directed to apply ice today at roughly 15 minutes at a time as needed.  It was discussed that they may be sore for the next few days or so.  Please avoid strenuous activity over the next 24 hours.  It was also discussed that the patient may have a increase in glucose if diabetic and should monitor levels.  Patient was instructed to call as needed.

## 2024-06-06 ENCOUNTER — OFFICE VISIT (OUTPATIENT)
Dept: INTERNAL MEDICINE | Facility: CLINIC | Age: 64
End: 2024-06-06
Payer: OTHER GOVERNMENT

## 2024-06-06 VITALS
RESPIRATION RATE: 18 BRPM | WEIGHT: 228.38 LBS | TEMPERATURE: 98 F | DIASTOLIC BLOOD PRESSURE: 78 MMHG | BODY MASS INDEX: 30.93 KG/M2 | OXYGEN SATURATION: 98 % | HEIGHT: 72 IN | SYSTOLIC BLOOD PRESSURE: 132 MMHG | HEART RATE: 62 BPM

## 2024-06-06 DIAGNOSIS — R73.03 PREDIABETES: ICD-10-CM

## 2024-06-06 DIAGNOSIS — I25.10 CORONARY ARTERY DISEASE INVOLVING NATIVE CORONARY ARTERY OF NATIVE HEART WITHOUT ANGINA PECTORIS: ICD-10-CM

## 2024-06-06 DIAGNOSIS — E78.5 HYPERLIPIDEMIA, UNSPECIFIED HYPERLIPIDEMIA TYPE: ICD-10-CM

## 2024-06-06 DIAGNOSIS — Z00.00 ENCOUNTER FOR MEDICAL EXAMINATION TO ESTABLISH CARE: Primary | ICD-10-CM

## 2024-06-06 DIAGNOSIS — I10 HYPERTENSION, UNSPECIFIED TYPE: ICD-10-CM

## 2024-06-06 DIAGNOSIS — Z95.820 S/P ANGIOPLASTY WITH STENT: ICD-10-CM

## 2024-06-06 PROBLEM — J44.9 COPD, MILD: Status: ACTIVE | Noted: 2021-01-08

## 2024-06-06 PROBLEM — T46.6X5A STATIN-INDUCED MYOSITIS: Status: ACTIVE | Noted: 2020-10-02

## 2024-06-06 PROBLEM — R35.0 BENIGN PROSTATIC HYPERPLASIA WITH URINARY FREQUENCY: Status: ACTIVE | Noted: 2021-01-08

## 2024-06-06 PROBLEM — M60.9 STATIN-INDUCED MYOSITIS: Status: ACTIVE | Noted: 2020-10-02

## 2024-06-06 PROBLEM — N40.1 BENIGN PROSTATIC HYPERPLASIA WITH URINARY FREQUENCY: Status: ACTIVE | Noted: 2021-01-08

## 2024-06-06 PROBLEM — G89.29 CHRONIC RIGHT SHOULDER PAIN: Status: ACTIVE | Noted: 2020-08-28

## 2024-06-06 PROBLEM — M25.511 CHRONIC RIGHT SHOULDER PAIN: Status: ACTIVE | Noted: 2020-08-28

## 2024-06-06 PROCEDURE — 99999 PR PBB SHADOW E&M-EST. PATIENT-LVL IV: CPT | Mod: PBBFAC,,, | Performed by: FAMILY MEDICINE

## 2024-06-06 PROCEDURE — 99214 OFFICE O/P EST MOD 30 MIN: CPT | Mod: S$GLB,,, | Performed by: FAMILY MEDICINE

## 2024-06-06 PROCEDURE — G2211 COMPLEX E/M VISIT ADD ON: HCPCS | Mod: S$GLB,,, | Performed by: FAMILY MEDICINE

## 2024-06-06 RX ORDER — GLUCOSAM/CHONDRO/HERB 149/HYAL 750-100 MG
TABLET ORAL
COMMUNITY
Start: 2024-01-24

## 2024-06-06 NOTE — PROGRESS NOTES
Jonas Galindo  06/06/2024  [id number]    Lala Roa MD  Patient Care Team:  Lala Roa MD as PCP - General (Family Medicine)          Visit Type:a scheduled routine follow-up visit    Chief Complaint:  Chief Complaint   Patient presents with    Follow-up       History of Present Illness:  Jonas Galindo is a 63 y.o. male with h/o CAD s/p PCI LAD x2 9/2020, pre diabetes, obesity, hypertension, HLD who was   Moved from [place] 1 year ago- saw my HAN, and needs to est care with me.    Had normal arterial U/S legs 7/22 was normal   Feels like he may have restless leg - Cards did CK, normal. Lower dose of the lipitor, Possible RLs?  Works form home   Used to exercise, but not much due to knee pain     Has been taking 40 mg of lipitor, prior was 80 mg  Triglycerides are high  Diet has changed since moving to [place]  Says weight has been stable for 4-5 yrs now     BP stable   Chewed tobacco, stopped 20 years ago  2 beers, glass wine weekly     Family hx: mom- heart disease     PreDM on labs  Did annual labs with HAN  Lab Results   Component Value Date    HGBA1C 5.7 (H) 11/28/2023     He has had cramping, upper abd and then in fingers and toes.  He said he does drink more water.  He does admit his fingers are better.      Health Maintenance Due   Topic Date Due    Hepatitis C Screening  Never done    HIV Screening  Never done    TETANUS VACCINE  Never done    Aspirin/Antiplatelet Therapy  Never done    Shingles Vaccine (1 of 2) Never done    RSV Vaccine (Age 60+ and Pregnant patients) (1 - 1-dose 60+ series) Never done           History:  Past Medical History:   Diagnosis Date    Heart disease     Had 2 stents placed     Past Surgical History:   Procedure Laterality Date    SPINE SURGERY  2015    TOTAL SHOULDER ARTHROPLASTY Right      No family history on file.  Social History     Socioeconomic History    Marital status:    Tobacco Use    Smoking status: Never    Smokeless tobacco: Former     Types:  Chew   Substance and Sexual Activity    Alcohol use: Not Currently    Drug use: Never     Social Determinants of Health     Financial Resource Strain: Low Risk  (11/28/2023)    Overall Financial Resource Strain (CARDIA)     Difficulty of Paying Living Expenses: Not hard at all   Food Insecurity: No Food Insecurity (11/28/2023)    Hunger Vital Sign     Worried About Running Out of Food in the Last Year: Never true     Ran Out of Food in the Last Year: Never true   Transportation Needs: No Transportation Needs (11/28/2023)    PRAPARE - Transportation     Lack of Transportation (Medical): No     Lack of Transportation (Non-Medical): No   Physical Activity: Sufficiently Active (11/28/2023)    Exercise Vital Sign     Days of Exercise per Week: 5 days     Minutes of Exercise per Session: 30 min   Stress: No Stress Concern Present (11/28/2023)    [employer] - Occupational Stress Questionnaire     Feeling of Stress : Not at all   Housing Stability: Unknown (11/28/2023)    Housing Stability Vital Sign     Number of Places Lived in the Last Year: 2     Unstable Housing in the Last Year: No     Patient Active Problem List   Diagnosis    Hyperlipidemia    Claudication    Hypertension    Obesity (BMI 30.0-34.9)    Prediabetes    Asthma    Coronary artery disease involving native coronary artery of native heart without angina pectoris    S/P angioplasty with stent    COPD, mild    Chronic right shoulder pain    Benign essential hypertension    Benign prostatic hyperplasia with urinary frequency    Statin-induced myositis     Review of patient's allergies indicates:   Allergen Reactions    Oseltamivir Palpitations and Rash       The following were reviewed at this visit: active problem list, medication list, allergies, family history, social history, and health maintenance.    Medications:  Current Outpatient Medications on File Prior to Visit   Medication Sig Dispense Refill    aspirin (ASPIR-81  ORAL)       atorvastatin (LIPITOR) 80 MG tablet Take 1 tablet (80 mg total) by mouth once daily. 90 tablet 3    beclomethasone (QVAR REDIHALER) 80 mcg/actuation inhaler Inhale 1 puff into the lungs once daily. 30.6 g 3    cetirizine (ZYRTEC) 10 MG tablet TAKE 1 TABLET BY MOUTH DAILY, THEN TWICE A DAY      chlorthalidone (HYGROTEN) 25 MG Tab Take 1 tablet (25 mg total) by mouth once daily. 90 tablet 3    multivitamin with minerals tablet Take 1 tablet by mouth once daily.      omega 3-dha-epa-fish oil (FISH OIL) 1,000 mg (120 mg-180 mg) Cap       psyllium (HYDROCIL) packet Take 1 packet by mouth once daily.      turmeric, bulk, 95 % Powd       zinc gluconate 50 mg tablet Take 50 mg by mouth.      FLUTICASONE FUROATE NASL  (Patient not taking: Reported on 6/6/2024)      [DISCONTINUED] ascorbic acid, vitamin C, (VITAMIN C) 1000 MG tablet Take 1,000 mg by mouth.      [DISCONTINUED] cholecalciferol, vitamin D3, 1,250 mcg (50,000 unit) capsule Take by mouth.      [DISCONTINUED] co-enzyme Q-10 30 mg capsule Take 30 mg by mouth.      [DISCONTINUED] cyanocobalamin (VITAMIN B-12) 100 MCG tablet Take 50 mcg by mouth.      [DISCONTINUED] ferrous gluconate 256 mg (28 mg iron) Tab Take by mouth.       No current facility-administered medications on file prior to visit.       Medications have been reviewed and reconciled with patient at this visit.  Barriers to medications reviewed with patient.    Adverse reactions to current medications reviewed with patient..    Over the counter medications reviewed and reconciled with patient.    Exam:  Wt Readings from Last 3 Encounters:   06/06/24 103.6 kg (228 lb 6.3 oz)   04/04/24 103.9 kg (229 lb 0.9 oz)   12/18/23 103.9 kg (229 lb)     Temp Readings from Last 3 Encounters:   06/06/24 97.7 °F (36.5 °C) (Tympanic)   11/28/23 97.7 °F (36.5 °C) (Tympanic)     BP Readings from Last 3 Encounters:   06/06/24 132/78   12/18/23 128/81   11/29/23 110/70     Pulse Readings from Last 3 Encounters:    06/06/24 62   12/18/23 63   11/29/23 80     Body mass index is 30.98 kg/m².      Review of Systems   Constitutional: Negative.  Negative for chills and fever.   HENT: Negative.  Negative for congestion, sinus pain and sore throat.    Eyes:  Negative for blurred vision and double vision.   Respiratory:  Negative for cough, sputum production, shortness of breath and wheezing.    Cardiovascular:  Negative for chest pain, palpitations and leg swelling.   Gastrointestinal:  Negative for abdominal pain, constipation, diarrhea, heartburn, nausea and vomiting.   Genitourinary: Negative.    Musculoskeletal:  Positive for joint pain.   Skin: Negative.  Negative for rash.   Neurological: Negative.    Endo/Heme/Allergies: Negative.  Negative for polydipsia. Does not bruise/bleed easily.   Psychiatric/Behavioral:  Negative for depression and substance abuse.      Physical Exam  Nursing note reviewed.   Cardiovascular:      Rate and Rhythm: Normal rate and regular rhythm.   Pulmonary:      Effort: Pulmonary effort is normal. No respiratory distress.   Neurological:      Mental Status: He is alert and oriented to person, place, and time.   Psychiatric:         Mood and Affect: Mood normal.         Behavior: Behavior normal.         Thought Content: Thought content normal.         Judgment: Judgment normal.         Laboratory Reviewed ({Yes)  Lab Results   Component Value Date    WBC 5.25 11/28/2023    HGB 15.1 11/28/2023    HCT 44.8 11/28/2023     11/28/2023    CHOL 178 11/28/2023    TRIG 328 (H) 11/28/2023    HDL 38 (L) 11/28/2023    ALT 27 11/28/2023    AST 37 11/28/2023     11/28/2023    K 4.2 11/28/2023     11/28/2023    CREATININE 1.1 11/28/2023    BUN 14 11/28/2023    CO2 30 (H) 11/28/2023    PSA 3.0 11/28/2023    HGBA1C 5.7 (H) 11/28/2023       Jonas was seen today for follow-up.    Diagnoses and all orders for this visit:    Encounter for medical examination to establish care  HM  reviewed  Hyperlipidemia, unspecified hyperlipidemia type  Lab Results   Component Value Date    LDLCALC 74.4 11/28/2023   CK fine  On statin 40 mg 1/2 80    Hypertension, unspecified type  Coronary artery disease involving native coronary artery of native heart without angina pectoris  S/P angioplasty with stent  BP in goal range  ASA   STATIn  Prediabetes  Noted on labs  REcheck annual   Diet and exercise    Leg Cramps?RLS  I think it more due to electrolytes.  Will try gatarade.  Vascular eval was negative of lower ext.               Care Plan/Goals: Reviewed    Goals    None       Visit today included increased complexity associated with the care of the episodic problem leg pain/cramps , which was addressed while instituting co-management of the longitudinal care of the patient due to the serious and/or complex managed problem(s) .    I have evaluated and discussed management associated with medical care services that serve as the continuing focal point for all needed health care services and/or with medical care services that are part of ongoing care related to my patient's single, serious condition or a complex condition(s).    I am providing ongoing care and I am the primary care provider for this patient, and they are being managed, monitored, and/or observed for their chronic conditions over time.     I have addressed their ongoing health maintenance requirements and needs for all health care services and reviewed co-management plans provided by specialty providers when available.    Health Maintenance Due   Topic Date Due    Hepatitis C Screening  Never done    HIV Screening  Never done    TETANUS VACCINE  Never done    Aspirin/Antiplatelet Therapy  Never done    Shingles Vaccine (1 of 2) Never done    RSV Vaccine (Age 60+ and Pregnant patients) (1 - 1-dose 60+ series) Never done       Follow up: No follow-ups on file.    After visit summary was printed and given to patient upon discharge today.  Patient  goals and care plan are included in After Visit Summary.

## 2024-07-08 ENCOUNTER — PROCEDURE VISIT (OUTPATIENT)
Dept: ORTHOPEDICS | Facility: CLINIC | Age: 64
End: 2024-07-08
Payer: OTHER GOVERNMENT

## 2024-07-08 VITALS
HEIGHT: 72 IN | WEIGHT: 228.38 LBS | BODY MASS INDEX: 30.93 KG/M2 | DIASTOLIC BLOOD PRESSURE: 84 MMHG | HEART RATE: 67 BPM | SYSTOLIC BLOOD PRESSURE: 133 MMHG

## 2024-07-08 DIAGNOSIS — M17.0 PRIMARY OSTEOARTHRITIS OF BOTH KNEES: Primary | ICD-10-CM

## 2024-07-08 RX ORDER — METHYLPREDNISOLONE ACETATE 80 MG/ML
80 INJECTION, SUSPENSION INTRA-ARTICULAR; INTRALESIONAL; INTRAMUSCULAR; SOFT TISSUE
Status: DISCONTINUED | OUTPATIENT
Start: 2024-07-08 | End: 2024-07-08 | Stop reason: HOSPADM

## 2024-07-08 RX ORDER — LIDOCAINE HYDROCHLORIDE 10 MG/ML
5 INJECTION INFILTRATION; PERINEURAL
Status: DISCONTINUED | OUTPATIENT
Start: 2024-07-08 | End: 2024-07-08 | Stop reason: HOSPADM

## 2024-07-08 RX ADMIN — LIDOCAINE HYDROCHLORIDE 5 ML: 10 INJECTION INFILTRATION; PERINEURAL at 10:07

## 2024-07-08 RX ADMIN — METHYLPREDNISOLONE ACETATE 80 MG: 80 INJECTION, SUSPENSION INTRA-ARTICULAR; INTRALESIONAL; INTRAMUSCULAR; SOFT TISSUE at 10:07

## 2024-07-08 NOTE — PROCEDURES
Large Joint Aspiration/Injection: bilateral knee    Date/Time: 7/8/2024 10:45 AM    Performed by: Danielle Chadwick PA  Authorized by: Danielle Chadwick PA    Consent Done?:  Yes (Verbal)  Indications:  Arthritis, joint swelling and pain  Site marked: the procedure site was marked      Local anesthesia used?: Yes    Local anesthetic:  Topical anesthetic    Details:  Needle Size:  21 G  Approach:  Anterolateral  Location:  Knee  Laterality:  Bilateral  Site:  Bilateral knee  Medications (Right):  5 mL LIDOcaine HCL 10 mg/ml (1%) 10 mg/mL (1 %); 80 mg methylPREDNISolone acetate 80 mg/mL  Medications (Left):  5 mL LIDOcaine HCL 10 mg/ml (1%) 10 mg/mL (1 %); 80 mg methylPREDNISolone acetate 80 mg/mL  Patient tolerance:  Patient tolerated the procedure well with no immediate complications     Verbal consent was obtained  The patient's ID, site, side was verified  The site was sterile prepped in standard fashion  The injection was performed in the amy-lateral side without complication  A sterile Band-Aid was applied    Patient was directed to apply ice today at roughly 15 minutes at a time as needed.  It was discussed that they may be sore for the next few days or so.  Please avoid strenuous activity over the next 24 hours.  It was also discussed that the patient may have a increase in glucose if diabetic and should monitor levels.  Patient was instructed to call as needed.

## 2024-08-20 DIAGNOSIS — I25.10 CORONARY ARTERY DISEASE, UNSPECIFIED VESSEL OR LESION TYPE, UNSPECIFIED WHETHER ANGINA PRESENT, UNSPECIFIED WHETHER NATIVE OR TRANSPLANTED HEART: Primary | ICD-10-CM

## 2024-08-28 ENCOUNTER — PATIENT MESSAGE (OUTPATIENT)
Dept: INTERNAL MEDICINE | Facility: CLINIC | Age: 64
End: 2024-08-28
Payer: OTHER GOVERNMENT

## 2024-08-29 ENCOUNTER — OFFICE VISIT (OUTPATIENT)
Dept: INTERNAL MEDICINE | Facility: CLINIC | Age: 64
End: 2024-08-29
Payer: OTHER GOVERNMENT

## 2024-08-29 DIAGNOSIS — I25.10 CORONARY ARTERY DISEASE INVOLVING NATIVE CORONARY ARTERY OF NATIVE HEART WITHOUT ANGINA PECTORIS: ICD-10-CM

## 2024-08-29 DIAGNOSIS — E87.6 HYPOKALEMIA: Primary | ICD-10-CM

## 2024-08-29 DIAGNOSIS — I10 HYPERTENSION, UNSPECIFIED TYPE: ICD-10-CM

## 2024-08-29 PROCEDURE — 99214 OFFICE O/P EST MOD 30 MIN: CPT | Mod: 95,,, | Performed by: FAMILY MEDICINE

## 2024-08-29 RX ORDER — POTASSIUM CHLORIDE 750 MG/1
10 TABLET, EXTENDED RELEASE ORAL
COMMUNITY
Start: 2024-08-28

## 2024-08-29 NOTE — PROGRESS NOTES
"Jonas Galindo  08/29/2024  [id number]    Lala Roa MD  Patient Care Team:  Lala Roa MD as PCP - General (Family Medicine)      The patient location is: home  The chief complaint leading to consultation is: ER    Visit type: audiovisual    Face to Face time with patient: 1  20 minutes of total time spent on the encounter, which includes face to face time and non-face to face time preparing to see the patient (eg, review of tests), Obtaining and/or reviewing separately obtained history, Documenting clinical information in the electronic or other health record, Independently interpreting results (not separately reported) and communicating results to the patient/family/caregiver, or Care coordination (not separately reported).         Each patient to whom he or she provides medical services by telemedicine is:  (1) informed of the relationship between the physician and patient and the respective role of any other health care provider with respect to management of the patient; and (2) notified that he or she may decline to receive medical services by telemedicine and may withdraw from such care at any time.    Notes:      Visit Type: ER follow up    Chief Complaint:Low Potassium    History of Present Illness:    History of Present Illness    CHIEF COMPLAINT:  Mr. Galindo presents today for follow up after an emergency room visit.    EMERGENCY ROOM VISIT AND SYMPTOMS:  He visited [facility] ER in [place] after feeling "weird all over" for about two days. He experienced an uneasy feeling, numbness, tingling, and a slight headache. He initially reported chest pain upon ER admission but clarifies it was brief and quickly resolved. He also noted visual changes, stating his eyes were okay but something seemed different. He denies fever or dehydration prior to symptom onset. Lab results revealed low potassium at 2.9 and slightly low magnesium. He received IV potassium and magnesium, which " alleviated his symptoms. He was discharged with a 14-day prescription for oral potassium supplements and reports feeling stable since then.    MEDICAL HISTORY:  He reports a previous instance of slightly low potassium during an annual checkup in [place] within the past five years, which was not concerning enough to warrant intervention at that time.    MEDICATIONS:  He currently takes chlorothiazide and is on a 14-day course of oral potassium supplements. He expresses a preference to avoid long-term potassium supplementation if possible.    LIFESTYLE:  His job involves frequent travel. He mentions having a lot of travel lately and notes an upcoming trip to [place] in mid-September.    FOLLOW-UP:  He has lab work scheduled for September 20th to check potassium and magnesium levels.        Answers submitted by the patient for this visit:  Review of Systems Questionnaire (Submitted on 8/28/2024)  activity change: No  unexpected weight change: No  rhinorrhea: No  trouble swallowing: No  visual disturbance: Yes  chest tightness: No  polyuria: No  difficulty urinating: No  joint swelling: Yes  arthralgias: Yes  confusion: No  dysphoric mood: No      The following were reviewed at this visit: active problem list, medication list, allergies, family history, social history, and health maintenance.  History:  Past Medical History:   Diagnosis Date    Heart disease     Had 2 stents placed     Past Surgical History:   Procedure Laterality Date    SPINE SURGERY  2015    TOTAL SHOULDER ARTHROPLASTY Right      Patient Active Problem List   Diagnosis    Hyperlipidemia    Claudication    Hypertension    Obesity (BMI 30.0-34.9)    Prediabetes    Asthma    Coronary artery disease involving native coronary artery of native heart without angina pectoris    S/P angioplasty with stent    COPD, mild    Chronic right shoulder pain    Benign essential hypertension    Benign prostatic hyperplasia with urinary frequency    Statin-induced  myositis       Medications:  Current Outpatient Medications on File Prior to Visit   Medication Sig Dispense Refill    potassium chloride SA (K-DUR,KLOR-CON M) 10 MEQ tablet Take 10 mEq by mouth.      aspirin (ASPIR-81 ORAL)       atorvastatin (LIPITOR) 80 MG tablet Take 1 tablet (80 mg total) by mouth once daily. 90 tablet 3    beclomethasone (QVAR REDIHALER) 80 mcg/actuation inhaler Inhale 1 puff into the lungs once daily. 30.6 g 3    cetirizine (ZYRTEC) 10 MG tablet TAKE 1 TABLET BY MOUTH DAILY, THEN TWICE A DAY      chlorthalidone (HYGROTEN) 25 MG Tab Take 1 tablet (25 mg total) by mouth once daily. 90 tablet 3    FLUTICASONE FUROATE NASL       multivitamin with minerals tablet Take 1 tablet by mouth once daily.      omega 3-dha-epa-fish oil (FISH OIL) 1,000 mg (120 mg-180 mg) Cap       psyllium (HYDROCIL) packet Take 1 packet by mouth once daily.      turmeric, bulk, 95 % Powd       zinc gluconate 50 mg tablet Take 50 mg by mouth.       No current facility-administered medications on file prior to visit.       Medications have been reviewed and reconciled with patient at this visit.    Exam:  Wt Readings from Last 3 Encounters:   07/08/24 103.6 kg (228 lb 6.3 oz)   06/06/24 103.6 kg (228 lb 6.3 oz)   04/04/24 103.9 kg (229 lb 0.9 oz)     Temp Readings from Last 3 Encounters:   06/06/24 97.7 °F (36.5 °C) (Tympanic)   11/28/23 97.7 °F (36.5 °C) (Tympanic)     BP Readings from Last 3 Encounters:   07/08/24 133/84   06/06/24 132/78   12/18/23 128/81     Pulse Readings from Last 3 Encounters:   07/08/24 67   06/06/24 62   12/18/23 63     There is no height or weight on file to calculate BMI.      Review of Systems   HENT:  Negative for hearing loss.    Eyes:  Negative for discharge.   Respiratory:  Negative for wheezing.    Cardiovascular:  Positive for chest pain. Negative for palpitations.   Gastrointestinal:  Negative for blood in stool, constipation, diarrhea and vomiting.   Genitourinary:  Negative for  hematuria and urgency.   Musculoskeletal:  Negative for neck pain.   Neurological:  Positive for weakness and headaches.   Endo/Heme/Allergies:  Negative for polydipsia.     Physical Exam  Nursing note reviewed.   Pulmonary:      Effort: Pulmonary effort is normal. No respiratory distress.   Neurological:      Mental Status: He is alert and oriented to person, place, and time.   Psychiatric:         Mood and Affect: Mood normal.         Behavior: Behavior normal.         Thought Content: Thought content normal.         Judgment: Judgment normal.       Physical Exam             Laboratory Reviewed ({Yes)  Lab Results   Component Value Date    WBC 5.25 11/28/2023    HGB 15.1 11/28/2023    HCT 44.8 11/28/2023     11/28/2023    CHOL 178 11/28/2023    TRIG 328 (H) 11/28/2023    HDL 38 (L) 11/28/2023    ALT 27 11/28/2023    AST 37 11/28/2023     11/28/2023    K 4.2 11/28/2023     11/28/2023    CREATININE 1.1 11/28/2023    BUN 14 11/28/2023    CO2 30 (H) 11/28/2023    PSA 3.0 11/28/2023    HGBA1C 5.7 (H) 11/28/2023       Diagnoses and all orders for this visit:    Hypokalemia  -     Basic Metabolic Panel; Future  -     Magnesium; Future    Hypertension, unspecified type    Coronary artery disease involving native coronary artery of native heart without angina pectoris          Assessment & Plan    R53.83 Other fatigue    E83.42 Hypomagnesemia    E87.6 Hypokalemia    Assessed patient's recent ER visit for generalized symptoms including headache, uneasiness, numbness, tingling, and brief chest pain  Reviewed ER findings of low potassium (2.9) and slightly low magnesium  Considered potential causes of electrolyte imbalance, including chlorothiazide use and possible dehydration  Determined current symptoms likely due to low potassium, given improvement after ER treatment  Decided against immediate stress test, as troponin was normal and symptoms improved with electrolyte correction  Plan to reassess need for  further cardiac workup if symptoms recur with normal potassium levels    ELECTROLYTE IMBALANCE:    - Explained that BP med can sometimes lead to electrolyte imbalances, especially in hot weather due to increased insensible losses.  - Discussed that low potassium can cause generalized symptoms like those experienced by the patient.  - Informed that electrolyte imbalances are usually correctable and may not recur if properly managed.  - Mr. Galindo to continue to hydrate adequately.  - Mr. Galindo to maintain a well-balanced diet.  - Continued potassium supplement for 14 days as prescribed by ER.  - Potassium and magnesium levels ordered to be done on September [facility]th at [facility].  MEDICATIONS/SUPPLEMENTS:    FOLLOW UP:  - Follow up on September 20th for ordered labs.  - Provider will review lab results and contact patient with further recommendations.          This note was generated with the assistance of ambient listening technology. Verbal consent was obtained by the patient and accompanying visitor(s) for the recording of patient appointment to facilitate this note. I attest to having reviewed and edited the generated note for accuracy, though some syntax or spelling errors may persist. Please contact the author of this note for any clarification.     Care Plan/Goals: Reviewed     Follow up: No follow-ups on file.    After visit summary was printed and given to patient upon discharge today.  Patient goals and care plan are included in After Visit Summary.

## 2024-09-30 ENCOUNTER — LAB VISIT (OUTPATIENT)
Dept: LAB | Facility: HOSPITAL | Age: 64
End: 2024-09-30
Attending: FAMILY MEDICINE
Payer: OTHER GOVERNMENT

## 2024-09-30 DIAGNOSIS — E87.6 HYPOKALEMIA: ICD-10-CM

## 2024-09-30 PROCEDURE — 36415 COLL VENOUS BLD VENIPUNCTURE: CPT | Mod: PO | Performed by: FAMILY MEDICINE

## 2024-09-30 PROCEDURE — 83735 ASSAY OF MAGNESIUM: CPT | Performed by: FAMILY MEDICINE

## 2024-09-30 PROCEDURE — 80048 BASIC METABOLIC PNL TOTAL CA: CPT | Performed by: FAMILY MEDICINE

## 2024-10-01 LAB
ANION GAP SERPL CALC-SCNC: 7 MMOL/L (ref 8–16)
BUN SERPL-MCNC: 15 MG/DL (ref 8–23)
CALCIUM SERPL-MCNC: 9.7 MG/DL (ref 8.7–10.5)
CHLORIDE SERPL-SCNC: 101 MMOL/L (ref 95–110)
CO2 SERPL-SCNC: 31 MMOL/L (ref 23–29)
CREAT SERPL-MCNC: 1 MG/DL (ref 0.5–1.4)
EST. GFR  (NO RACE VARIABLE): >60 ML/MIN/1.73 M^2
GLUCOSE SERPL-MCNC: 106 MG/DL (ref 70–110)
MAGNESIUM SERPL-MCNC: 1.9 MG/DL (ref 1.6–2.6)
POTASSIUM SERPL-SCNC: 4.2 MMOL/L (ref 3.5–5.1)
SODIUM SERPL-SCNC: 139 MMOL/L (ref 136–145)

## 2024-10-04 ENCOUNTER — PROCEDURE VISIT (OUTPATIENT)
Dept: ORTHOPEDICS | Facility: CLINIC | Age: 64
End: 2024-10-04
Payer: OTHER GOVERNMENT

## 2024-10-04 VITALS — BODY MASS INDEX: 30.93 KG/M2 | WEIGHT: 228.38 LBS | HEIGHT: 72 IN

## 2024-10-04 DIAGNOSIS — M17.0 PRIMARY OSTEOARTHRITIS OF BOTH KNEES: Primary | ICD-10-CM

## 2024-10-04 NOTE — PROCEDURES
Large Joint Aspiration/Injection: bilateral knee    Date/Time: 10/4/2024 9:45 AM    Performed by: Danielle Chadwick PA  Authorized by: Danielle Chadwick PA    Consent Done?:  Yes (Verbal)  Indications:  Arthritis, joint swelling and pain  Site marked: the procedure site was marked      Local anesthesia used?: Yes    Local anesthetic:  Topical anesthetic    Details:  Needle Size:  21 G  Approach:  Anterolateral  Location:  Knee  Laterality:  Bilateral  Site:  Bilateral knee  Medications (Right):  60 mg hyaluronate sodium, stabilized (DUROLANE) 60 mg/3 mL  Medications (Left):  60 mg hyaluronate sodium, stabilized (DUROLANE) 60 mg/3 mL  Patient tolerance:  Patient tolerated the procedure well with no immediate complications     Verbal consent was obtained  The patient's ID, site, side was verified  The site was sterile prepped in standard fashion  The injection was performed in the amy-lateral side without complication  A sterile Band-Aid was applied    Patient was directed to apply ice today at roughly 15 minutes at a time as needed.  It was discussed that they may be sore for the next few days or so.  Please avoid strenuous activity over the next 24 hours.  It was also discussed that the patient may have a increase in glucose if diabetic and should monitor levels.  Patient was instructed to call as needed.

## 2024-10-15 ENCOUNTER — HOSPITAL ENCOUNTER (OUTPATIENT)
Dept: CARDIOLOGY | Facility: HOSPITAL | Age: 64
Discharge: HOME | End: 2024-10-15
Attending: STUDENT IN AN ORGANIZED HEALTH CARE EDUCATION/TRAINING PROGRAM
Payer: OTHER GOVERNMENT

## 2024-10-15 ENCOUNTER — OFFICE VISIT (OUTPATIENT)
Dept: CARDIOLOGY | Facility: CLINIC | Age: 64
End: 2024-10-15
Payer: OTHER GOVERNMENT

## 2024-10-15 VITALS
DIASTOLIC BLOOD PRESSURE: 80 MMHG | BODY MASS INDEX: 30.41 KG/M2 | HEART RATE: 69 BPM | SYSTOLIC BLOOD PRESSURE: 122 MMHG | WEIGHT: 224.19 LBS | OXYGEN SATURATION: 96 %

## 2024-10-15 DIAGNOSIS — I25.10 CORONARY ARTERY DISEASE INVOLVING NATIVE CORONARY ARTERY OF NATIVE HEART WITHOUT ANGINA PECTORIS: ICD-10-CM

## 2024-10-15 DIAGNOSIS — E78.5 HYPERLIPIDEMIA, UNSPECIFIED HYPERLIPIDEMIA TYPE: ICD-10-CM

## 2024-10-15 DIAGNOSIS — I10 HYPERTENSION, UNSPECIFIED TYPE: Primary | ICD-10-CM

## 2024-10-15 DIAGNOSIS — R73.03 PREDIABETES: ICD-10-CM

## 2024-10-15 DIAGNOSIS — I25.10 CORONARY ARTERY DISEASE, UNSPECIFIED VESSEL OR LESION TYPE, UNSPECIFIED WHETHER ANGINA PRESENT, UNSPECIFIED WHETHER NATIVE OR TRANSPLANTED HEART: Primary | ICD-10-CM

## 2024-10-15 DIAGNOSIS — E66.811 OBESITY (BMI 30.0-34.9): ICD-10-CM

## 2024-10-15 DIAGNOSIS — M79.604 PAIN IN BOTH LOWER EXTREMITIES: ICD-10-CM

## 2024-10-15 DIAGNOSIS — M25.562 CHRONIC PAIN OF BOTH KNEES: ICD-10-CM

## 2024-10-15 DIAGNOSIS — M79.605 PAIN IN BOTH LOWER EXTREMITIES: ICD-10-CM

## 2024-10-15 DIAGNOSIS — I10 HYPERTENSION, UNSPECIFIED TYPE: ICD-10-CM

## 2024-10-15 DIAGNOSIS — I73.9 CLAUDICATION: ICD-10-CM

## 2024-10-15 DIAGNOSIS — M25.561 CHRONIC PAIN OF BOTH KNEES: ICD-10-CM

## 2024-10-15 DIAGNOSIS — J45.909 ASTHMA, UNSPECIFIED ASTHMA SEVERITY, UNSPECIFIED WHETHER COMPLICATED, UNSPECIFIED WHETHER PERSISTENT: ICD-10-CM

## 2024-10-15 DIAGNOSIS — G89.29 CHRONIC PAIN OF BOTH KNEES: ICD-10-CM

## 2024-10-15 PROCEDURE — 93010 ELECTROCARDIOGRAM REPORT: CPT | Mod: ,,, | Performed by: INTERNAL MEDICINE

## 2024-10-15 PROCEDURE — 93005 ELECTROCARDIOGRAM TRACING: CPT | Mod: PO

## 2024-10-15 PROCEDURE — 99999 PR PBB SHADOW E&M-EST. PATIENT-LVL III: CPT | Mod: PBBFAC,,, | Performed by: STUDENT IN AN ORGANIZED HEALTH CARE EDUCATION/TRAINING PROGRAM

## 2024-10-15 NOTE — PROGRESS NOTES
Section of Cardiology                  Cardiac Clinic Note    Chief Complaint/Reason for consultation:  leg pain      HPI:   Jonas Galindo is a 64 y.o. male with h/o CAD s/p PCI LAD x2 9/2020, pre diabetes, obesity, hypertension, HLD who was referred to cardiology clinic by ANASTASIA Mariscal for evaluation.    11/29/23  Moved from [place] 6m ago  Has been having claudication for about a few months  When he sitting or walks, hamstrings hurt with radiation to middle thigh  Had normal arterial U/S legs 7/22 was normal   Feels like he may have restless leg   Works form home   Used to exercise, but not much due to knee pain    Has been taking 40 mg of lipitor, prior was 80 mg  Triglycerides are high  Diet has changed since moving to [facility] weight has been stable for 4-5 yrs now    BP stable   Chewed tobacco, stopped 20 years ago  2 beers, glass wine weekly    Family hx: mom- heart disease       10/15/24  Lost 5 lbs since last visit   Doing well  Exercises  Watching diet  Still with cramps in legs  Normal CK  Takes coQ 10    Denies chest pain, SOB, LH, N/V        EKG 11/29/23 NSR, nonspecific ST wave       ECHO  No results found for this or any previous visit.       STRESS TEST No results found for this or any previous visit.       LHC No results found for this or any previous visit.            ROS: All 10 systems reviewed. Please refer to the HPI for pertinent positives. All other systems negative.     Past Medical History  Past Medical History:   Diagnosis Date    Heart disease     Had 2 stents placed       Surgical History  Past Surgical History:   Procedure Laterality Date    SPINE SURGERY  2015    TOTAL SHOULDER ARTHROPLASTY Right           Allergies:   Review of patient's allergies indicates:   Allergen Reactions    Oseltamivir Palpitations and Rash       Social History:  Social History     Socioeconomic History    Marital status:    Tobacco Use    Smoking status: Never    Smokeless tobacco:  Former     Types: Chew   Substance and Sexual Activity    Alcohol use: Not Currently    Drug use: Never     Social Drivers of Health     Financial Resource Strain: Low Risk  (11/28/2023)    Overall Financial Resource Strain (CARDIA)     Difficulty of Paying Living Expenses: Not hard at all   Food Insecurity: No Food Insecurity (11/28/2023)    Hunger Vital Sign     Worried About Running Out of Food in the Last Year: Never true     Ran Out of Food in the Last Year: Never true   Transportation Needs: No Transportation Needs (11/28/2023)    PRAPARE - Transportation     Lack of Transportation (Medical): No     Lack of Transportation (Non-Medical): No   Physical Activity: Sufficiently Active (11/28/2023)    Exercise Vital Sign     Days of Exercise per Week: 5 days     Minutes of Exercise per Session: 30 min   Stress: No Stress Concern Present (11/28/2023)    [employer] - Occupational Stress Questionnaire     Feeling of Stress : Not at all   Housing Stability: Unknown (11/28/2023)    Housing Stability Vital Sign     Number of Places Lived in the Last Year: 2     Unstable Housing in the Last Year: No       Family History:  family history is not on file.    Home Medications:  Current Outpatient Medications on File Prior to Visit   Medication Sig Dispense Refill    aspirin (ASPIR-81 ORAL)       atorvastatin (LIPITOR) 80 MG tablet Take 1 tablet (80 mg total) by mouth once daily. 90 tablet 3    beclomethasone (QVAR REDIHALER) 80 mcg/actuation inhaler Inhale 1 puff into the lungs once daily. 30.6 g 3    cetirizine (ZYRTEC) 10 MG tablet TAKE 1 TABLET BY MOUTH DAILY, THEN TWICE A DAY      chlorthalidone (HYGROTEN) 25 MG Tab Take 1 tablet (25 mg total) by mouth once daily. 90 tablet 3    FLUTICASONE FUROATE NASL       multivitamin with minerals tablet Take 1 tablet by mouth once daily.      omega 3-dha-epa-fish oil (FISH OIL) 1,000 mg (120 mg-180 mg) Cap       potassium chloride SA (K-ASIF,KLOR-CON M)  "10 MEQ tablet Take 10 mEq by mouth.      psyllium (HYDROCIL) packet Take 1 packet by mouth once daily.      turmeric, bulk, 95 % Powd       zinc gluconate 50 mg tablet Take 50 mg by mouth.       No current facility-administered medications on file prior to visit.       Physical exam:  /80   Pulse 69   Wt 101.7 kg (224 lb 3 oz)   SpO2 96%   BMI 30.41 kg/m²         General: Pt is a 64 y.o. year old male who is AAOx3, in NAD, is pleasant, well nourished, looks stated age  HEENT: PERRL, EOMI, Oral mucosa pink & moist  CVS  No abnormal cardiac pulsations noted on inspection. JVP not raised. The apical impulse is normal on palpation, and is located in the left 5th intercostal space in the mid - clavicular line. No palpable thrills or abnormal pulsations noted. RR, S1 - S2 heard, no murmurs, rubs or gallops appreciated.   PUL : CTA B/L. No wheezes/crackles heard   ABD : BS +, soft. No tenderness elicited   LE : No C/C/E. Distal Pulses palpable B/L         LABS:    Chemistry:   Lab Results   Component Value Date     09/30/2024    K 4.2 09/30/2024     09/30/2024    CO2 31 (H) 09/30/2024    BUN 15 09/30/2024    CREATININE 1.0 09/30/2024    CALCIUM 9.7 09/30/2024     Cardiac Markers: No results found for: "CKTOTAL", "CKMB", "CKMBINDEX", "TROPONINI"  Cardiac Markers (Last 3): No results found for: "CKTOTAL", "CKMB", "CKMBINDEX", "TROPONINI"  CBC:   Lab Results   Component Value Date    WBC 5.25 11/28/2023    HGB 15.1 11/28/2023    HCT 44.8 11/28/2023    MCV 90 11/28/2023     11/28/2023     Lipids:   Lab Results   Component Value Date    CHOL 178 11/28/2023    TRIG 328 (H) 11/28/2023    HDL 38 (L) 11/28/2023     Coagulation: No results found for: "PT", "INR", "APTT"        Assessment        1. Coronary artery disease, unspecified vessel or lesion type, unspecified whether angina present, unspecified whether native or transplanted heart    2. Hyperlipidemia, unspecified hyperlipidemia type    3. " Claudication    4. Hypertension, unspecified type    5. Obesity (BMI 30.0-34.9)    6. Chronic pain of both knees    7. Prediabetes    8. Pain in both lower extremities    9. Asthma, unspecified asthma severity, unspecified whether complicated, unspecified whether persistent           Plan:      Leg pain/cramps  CK- nml   Continue lipitor for now   Had arterial u/s 7/22 with no significant blockages in legs, normal ABIs  Increase coQ10 to 200 mg daily     Hypertension  Stable   Continue chlorthalidone    CAD  s/p PCI LAD 9/2020  Denies chest pain   Continue aspirin, statin    Asthma  Stable   Takes intermittent inhalers    Prediabetes   A1c 5.7   Continue therapy    Knee pain  Will see ortho  Unable to exercise due to knee pain    HLD  LDL 74 as 11/23    - diet and exercise recommended   Continue statin  Lipids pending     Obesity, Body mass index is 30.41 kg/m².   Low salt, low fat diet  Exercise as tolerated, at least 30 min daily     This note was prepared using voice recognition system and is likely to have sound alike errors that may have been overlooked even after proofreading.     I have reviewed all pertinent chart information.  Plans and recommendations have been formulated under my direct supervision. All questions answered and patient voiced understanding.   If symptoms persist go to the ED.    RTC in 1 year        Adriana Olmstead MD  Cardiology

## 2024-10-16 LAB
OHS QRS DURATION: 86 MS
OHS QTC CALCULATION: 431 MS

## 2024-11-26 ENCOUNTER — LAB VISIT (OUTPATIENT)
Dept: LAB | Facility: HOSPITAL | Age: 64
End: 2024-11-26
Attending: FAMILY MEDICINE
Payer: OTHER GOVERNMENT

## 2024-11-26 DIAGNOSIS — I25.10 CORONARY ARTERY DISEASE INVOLVING NATIVE CORONARY ARTERY OF NATIVE HEART WITHOUT ANGINA PECTORIS: ICD-10-CM

## 2024-11-26 DIAGNOSIS — Z95.820 S/P ANGIOPLASTY WITH STENT: ICD-10-CM

## 2024-11-26 DIAGNOSIS — R73.03 PREDIABETES: ICD-10-CM

## 2024-11-26 DIAGNOSIS — Z00.00 ENCOUNTER FOR MEDICAL EXAMINATION TO ESTABLISH CARE: ICD-10-CM

## 2024-11-26 DIAGNOSIS — E78.5 HYPERLIPIDEMIA, UNSPECIFIED HYPERLIPIDEMIA TYPE: ICD-10-CM

## 2024-11-26 DIAGNOSIS — I10 HYPERTENSION, UNSPECIFIED TYPE: ICD-10-CM

## 2024-11-26 LAB
ALBUMIN SERPL BCP-MCNC: 4.2 G/DL (ref 3.5–5.2)
ALP SERPL-CCNC: 72 U/L (ref 40–150)
ALT SERPL W/O P-5'-P-CCNC: 29 U/L (ref 10–44)
ANION GAP SERPL CALC-SCNC: 9 MMOL/L (ref 8–16)
AST SERPL-CCNC: 30 U/L (ref 10–40)
BASOPHILS # BLD AUTO: 0.06 K/UL (ref 0–0.2)
BASOPHILS NFR BLD: 1.5 % (ref 0–1.9)
BILIRUB SERPL-MCNC: 0.8 MG/DL (ref 0.1–1)
BUN SERPL-MCNC: 9 MG/DL (ref 8–23)
CALCIUM SERPL-MCNC: 9.4 MG/DL (ref 8.7–10.5)
CHLORIDE SERPL-SCNC: 103 MMOL/L (ref 95–110)
CHOLEST SERPL-MCNC: 152 MG/DL (ref 120–199)
CHOLEST/HDLC SERPL: 4.1 {RATIO} (ref 2–5)
CO2 SERPL-SCNC: 29 MMOL/L (ref 23–29)
COMPLEXED PSA SERPL-MCNC: 3.7 NG/ML (ref 0–4)
CREAT SERPL-MCNC: 1 MG/DL (ref 0.5–1.4)
DIFFERENTIAL METHOD BLD: ABNORMAL
EOSINOPHIL # BLD AUTO: 0.2 K/UL (ref 0–0.5)
EOSINOPHIL NFR BLD: 4 % (ref 0–8)
ERYTHROCYTE [DISTWIDTH] IN BLOOD BY AUTOMATED COUNT: 12.6 % (ref 11.5–14.5)
EST. GFR  (NO RACE VARIABLE): >60 ML/MIN/1.73 M^2
ESTIMATED AVG GLUCOSE: 120 MG/DL (ref 68–131)
GLUCOSE SERPL-MCNC: 122 MG/DL (ref 70–110)
HBA1C MFR BLD: 5.8 % (ref 4–5.6)
HCT VFR BLD AUTO: 44.3 % (ref 40–54)
HDLC SERPL-MCNC: 37 MG/DL (ref 40–75)
HDLC SERPL: 24.3 % (ref 20–50)
HGB BLD-MCNC: 14.7 G/DL (ref 14–18)
IMM GRANULOCYTES # BLD AUTO: 0.01 K/UL (ref 0–0.04)
IMM GRANULOCYTES NFR BLD AUTO: 0.3 % (ref 0–0.5)
LDLC SERPL CALC-MCNC: 84.6 MG/DL (ref 63–159)
LYMPHOCYTES # BLD AUTO: 1.4 K/UL (ref 1–4.8)
LYMPHOCYTES NFR BLD: 33.8 % (ref 18–48)
MCH RBC QN AUTO: 31.1 PG (ref 27–31)
MCHC RBC AUTO-ENTMCNC: 33.2 G/DL (ref 32–36)
MCV RBC AUTO: 94 FL (ref 82–98)
MONOCYTES # BLD AUTO: 0.4 K/UL (ref 0.3–1)
MONOCYTES NFR BLD: 9.5 % (ref 4–15)
NEUTROPHILS # BLD AUTO: 2 K/UL (ref 1.8–7.7)
NEUTROPHILS NFR BLD: 50.9 % (ref 38–73)
NONHDLC SERPL-MCNC: 115 MG/DL
NRBC BLD-RTO: 0 /100 WBC
PLATELET # BLD AUTO: 218 K/UL (ref 150–450)
PMV BLD AUTO: 10 FL (ref 9.2–12.9)
POTASSIUM SERPL-SCNC: 3.6 MMOL/L (ref 3.5–5.1)
PROT SERPL-MCNC: 7.3 G/DL (ref 6–8.4)
RBC # BLD AUTO: 4.73 M/UL (ref 4.6–6.2)
SODIUM SERPL-SCNC: 141 MMOL/L (ref 136–145)
TRIGL SERPL-MCNC: 152 MG/DL (ref 30–150)
WBC # BLD AUTO: 4 K/UL (ref 3.9–12.7)

## 2024-11-26 PROCEDURE — 80053 COMPREHEN METABOLIC PANEL: CPT | Performed by: FAMILY MEDICINE

## 2024-11-26 PROCEDURE — 80061 LIPID PANEL: CPT | Performed by: FAMILY MEDICINE

## 2024-11-26 PROCEDURE — 84153 ASSAY OF PSA TOTAL: CPT | Performed by: FAMILY MEDICINE

## 2024-11-26 PROCEDURE — 85025 COMPLETE CBC W/AUTO DIFF WBC: CPT | Performed by: FAMILY MEDICINE

## 2024-11-26 PROCEDURE — 83036 HEMOGLOBIN GLYCOSYLATED A1C: CPT | Performed by: FAMILY MEDICINE

## 2024-12-09 ENCOUNTER — OFFICE VISIT (OUTPATIENT)
Dept: INTERNAL MEDICINE | Facility: CLINIC | Age: 64
End: 2024-12-09
Payer: OTHER GOVERNMENT

## 2024-12-09 ENCOUNTER — TELEPHONE (OUTPATIENT)
Dept: INTERNAL MEDICINE | Facility: CLINIC | Age: 64
End: 2024-12-09
Payer: OTHER GOVERNMENT

## 2024-12-09 VITALS
HEART RATE: 69 BPM | OXYGEN SATURATION: 95 % | TEMPERATURE: 97 F | WEIGHT: 223.75 LBS | HEIGHT: 72 IN | SYSTOLIC BLOOD PRESSURE: 120 MMHG | RESPIRATION RATE: 18 BRPM | BODY MASS INDEX: 30.31 KG/M2 | DIASTOLIC BLOOD PRESSURE: 80 MMHG

## 2024-12-09 DIAGNOSIS — M17.10 ARTHRITIS OF KNEE: ICD-10-CM

## 2024-12-09 DIAGNOSIS — R73.03 PREDIABETES: ICD-10-CM

## 2024-12-09 DIAGNOSIS — I25.10 CORONARY ARTERY DISEASE INVOLVING NATIVE CORONARY ARTERY OF NATIVE HEART WITHOUT ANGINA PECTORIS: Primary | ICD-10-CM

## 2024-12-09 DIAGNOSIS — E78.5 HYPERLIPIDEMIA, UNSPECIFIED HYPERLIPIDEMIA TYPE: ICD-10-CM

## 2024-12-09 DIAGNOSIS — I10 BENIGN ESSENTIAL HYPERTENSION: ICD-10-CM

## 2024-12-09 PROCEDURE — 99214 OFFICE O/P EST MOD 30 MIN: CPT | Mod: S$GLB,,, | Performed by: FAMILY MEDICINE

## 2024-12-09 PROCEDURE — 99999 PR PBB SHADOW E&M-EST. PATIENT-LVL V: CPT | Mod: PBBFAC,,, | Performed by: FAMILY MEDICINE

## 2024-12-09 PROCEDURE — G2211 COMPLEX E/M VISIT ADD ON: HCPCS | Mod: S$GLB,,, | Performed by: FAMILY MEDICINE

## 2024-12-09 RX ORDER — ASPIRIN 81 MG/1
81 TABLET ORAL DAILY
Qty: 90 TABLET | Refills: 3 | Status: SHIPPED | OUTPATIENT
Start: 2024-12-09 | End: 2025-12-09

## 2024-12-09 RX ORDER — TAMSULOSIN HYDROCHLORIDE 0.4 MG/1
0.4 CAPSULE ORAL DAILY
Qty: 30 CAPSULE | Refills: 11 | Status: SHIPPED | OUTPATIENT
Start: 2024-12-09 | End: 2025-12-09

## 2024-12-09 RX ORDER — ATORVASTATIN CALCIUM 80 MG/1
40 TABLET, FILM COATED ORAL DAILY
Qty: 90 TABLET | Refills: 3 | Status: SHIPPED | OUTPATIENT
Start: 2024-12-09

## 2024-12-09 RX ORDER — SILDENAFIL 50 MG/1
50 TABLET, FILM COATED ORAL DAILY PRN
Qty: 10 TABLET | Refills: 0 | Status: SHIPPED | OUTPATIENT
Start: 2024-12-09 | End: 2025-12-09

## 2024-12-09 NOTE — TELEPHONE ENCOUNTER
----- Message from Pastora sent at 12/9/2024 10:49 AM CST -----  Contact: Jonas  .Type:  Needs Medical Advice    Who Called:  Jonas     Would the patient rather a call back or a response via [facility]?  Call back   Best Call Back Number:  [phone]   Additional Information:  Pt is calling in regards to receiving a call on 12/04 stating that the appt on 12/09 needed to be rescheduled and pt would like to get a call back to confirm that the appt does not need to be rescheduled     Thanks

## 2024-12-09 NOTE — Clinical Note
He is having ED.  No CP, and is not on nitrates, any objection to Viagra? We discussed issues with it, if ever has CP.

## 2024-12-09 NOTE — TELEPHONE ENCOUNTER
Returned patient's call. Informed patient that he can still come at this scheduled time on today. Patient voiced understanding and will be here for 2:40

## 2024-12-09 NOTE — PROGRESS NOTES
Jonas Galindo  12/09/2024  [id number]    Lala Roa MD  Patient Care Team:  Lala Roa MD as PCP - General (Family Medicine)          Visit Type:a scheduled routine follow-up visit    Chief Complaint:  Chief Complaint   Patient presents with    Leg Pain    Follow-up     6 month    Prostate Check       History of Present Illness:    History of Present Illness    CHIEF COMPLAINT:  Mr. Galindo presents today for follow-up.    CARDIOVASCULAR:  He has a history of coronary artery disease with PCI of the LAD in 2020. He denies experiencing any chest pain or shortness of breath. ABIs on extremity in 2022 showed no significant decrease.    METABOLIC:  He has a history of prediabetes, hypertension, and hyperlipidemia. He plans to change his diet, focusing on incorporating more leafy greens and vegetables while reducing carbohydrates and sugars.    MEDICATIONS:  Current medications include Lipitor  1/2 80  (40mg)daily, hydrochlorothiazide 25 mg daily, aspirin 81 mg daily, and CoQ10.    MUSCULOSKELETAL:  He reports ongoing knee issues, receiving alternating gel and steroid injections every three months, which he describes as painful. He also reports leg pain and cramps.    GENITOURINARY:  He mentions urinary symptoms, including increased frequency of urination at night and decreased urinary stream.    SEXUAL HEALTH:  He reports decreased sexual activity and expresses interest in potentially using medications like Viagra or Cialis. He inquires about the safety of these medications given his cardiac stent and any necessary testing prior to use.    COGNITIVE FUNCTION:  He reports experiencing occasional cognitive difficulties, particularly during meetings, describing moments of confusion or forgetfulness. He denies any significant impact on his ability to participate in meetings or perform his usual activities.    LIFESTYLE:  He engages in regular physical activity, including playing pinStion pong frequently and  walking quite a bit. He takes a fiber supplement daily, which he finds beneficial.    PREVENTIVE CARE:  He declines flu vaccine. He participates in annual stool sample collection for colon cancer screening.            The following were reviewed at this visit: active problem list, medication list, allergies, family history, social history, and health maintenance.  History:  Past Medical History:   Diagnosis Date    Heart disease     Had 2 stents placed     Past Surgical History:   Procedure Laterality Date    SPINE SURGERY  2015    TOTAL SHOULDER ARTHROPLASTY Right      Patient Active Problem List   Diagnosis    Hyperlipidemia    Claudication    Hypertension    Obesity (BMI 30.0-34.9)    Prediabetes    Asthma    Coronary artery disease involving native coronary artery of native heart without angina pectoris    S/P angioplasty with stent    COPD, mild    Chronic right shoulder pain    Benign essential hypertension    Benign prostatic hyperplasia with urinary frequency    Statin-induced myositis       Medications:  Current Outpatient Medications on File Prior to Visit   Medication Sig Dispense Refill    aspirin (ASPIR-81 ORAL)       beclomethasone (QVAR REDIHALER) 80 mcg/actuation inhaler Inhale 1 puff into the lungs once daily. 30.6 g 3    cetirizine (ZYRTEC) 10 MG tablet TAKE 1 TABLET BY MOUTH DAILY, THEN TWICE A DAY      chlorthalidone (HYGROTEN) 25 MG Tab Take 1 tablet (25 mg total) by mouth once daily. 90 tablet 3    coenzyme Q10 200 mg/gram Powd Take 200 mg by mouth every evening.      FLUTICASONE FUROATE NASL       multivitamin with minerals tablet Take 1 tablet by mouth once daily.      omega 3-dha-epa-fish oil (FISH OIL) 1,000 mg (120 mg-180 mg) Cap       psyllium (HYDROCIL) packet Take 1 packet by mouth once daily.      turmeric, bulk, 95 % Powd       zinc gluconate 50 mg tablet Take 50 mg by mouth.      [DISCONTINUED] atorvastatin (LIPITOR) 80 MG tablet Take 1 tablet (80 mg total) by mouth once daily. 90  tablet 3    [DISCONTINUED] potassium chloride SA (K-DUR,KLOR-CON M) 10 MEQ tablet Take 10 mEq by mouth.       No current facility-administered medications on file prior to visit.       Medications have been reviewed and reconciled with patient at this visit.    Exam:  Wt Readings from Last 3 Encounters:   12/09/24 101.5 kg (223 lb 12.3 oz)   10/15/24 101.7 kg (224 lb 3 oz)   10/04/24 103.6 kg (228 lb 6.3 oz)     Temp Readings from Last 3 Encounters:   12/09/24 97.1 °F (36.2 °C) (Tympanic)   06/06/24 97.7 °F (36.5 °C) (Tympanic)   11/28/23 97.7 °F (36.5 °C) (Tympanic)     BP Readings from Last 3 Encounters:   12/09/24 120/80   10/15/24 122/80   07/08/24 133/84     Pulse Readings from Last 3 Encounters:   12/09/24 69   10/15/24 69   07/08/24 67     Body mass index is 30.35 kg/m².      Review of Systems   Constitutional: Negative.  Negative for chills and fever.   HENT: Negative.  Negative for congestion, sinus pain and sore throat.    Eyes:  Negative for blurred vision and double vision.   Respiratory:  Negative for cough, sputum production, shortness of breath and wheezing.    Cardiovascular:  Negative for chest pain, palpitations and leg swelling.   Gastrointestinal:  Negative for abdominal pain, constipation, diarrhea, heartburn, nausea and vomiting.   Genitourinary: Negative.         Decrease in Stream     Musculoskeletal: Negative.    Skin: Negative.  Negative for rash.   Neurological: Negative.    Endo/Heme/Allergies: Negative.  Negative for polydipsia. Does not bruise/bleed easily.   Psychiatric/Behavioral:  Negative for depression and substance abuse.      Physical Exam  Nursing note reviewed.   Cardiovascular:      Rate and Rhythm: Normal rate and regular rhythm.   Pulmonary:      Effort: Pulmonary effort is normal. No respiratory distress.   Musculoskeletal:         General: Tenderness present.      Comments: Knee arthritis     Neurological:      Mental Status: He is alert and oriented to person, place, and  time.   Psychiatric:         Mood and Affect: Mood normal.         Behavior: Behavior normal.         Thought Content: Thought content normal.         Judgment: Judgment normal.       Physical Exam    Vitals: Blood pressure well controlled.         Laboratory Reviewed ({Yes)    Lab Results   Component Value Date    WBC 4.00 11/26/2024    HGB 14.7 11/26/2024    HCT 44.3 11/26/2024     11/26/2024    CHOL 152 11/26/2024    TRIG 152 (H) 11/26/2024    HDL 37 (L) 11/26/2024    ALT 29 11/26/2024    AST 30 11/26/2024     11/26/2024    K 3.6 11/26/2024     11/26/2024    CREATININE 1.0 11/26/2024    BUN 9 11/26/2024    CO2 29 11/26/2024    PSA 3.7 11/26/2024    HGBA1C 5.8 (H) 11/26/2024       Assessment & Plan    I25.750 Atherosclerosis of native coronary artery of transplanted heart with unstable angina    Z95.5 Presence of coronary angioplasty implant and graft    R73.09 Other abnormal glucose    I10 Essential (primary) hypertension    E78.5 Hyperlipidemia, unspecified    Z79.82 Long term (current) use of aspirin    M79.604 Pain in right leg  M25.561 Pain in right knee  M25.562 Pain in left knee    M17.9 Osteoarthritis of knee, unspecified    N40.1 Benign prostatic hyperplasia with lower urinary tract symptoms    R35.0 Frequency of micturition  R39.12 Poor urinary stream, symptoms of ED      Z71.3 Dietary counseling and surveillance      Reviewed lab results: lipid profile improved (triglycerides 152, LDL 84), HbA1c 5.8 (prediabetic range), glucose 122, normal kidney and liver function, normal EGFR  Assessed cardiovascular risk: history of CAD with PCI of LAD in 2020  Evaluated prostate health: PSA normal, discussed BPH symptoms  Considered knee replacement timing based on patient's functional decline and pain tolerance  Assessed appropriateness of ED medication use given patient's cardiovascular history    PROSTATE HEALTH AND SCREENING:  - Explained prostate cancer screening limitations and BPH  management options.  - Started Flomax as needed for urinary symptoms.  - Contact the office if urinary symptoms worsen.    KNEE PAIN AND OSTEOARTHRITIS:  - Discussed knee replacement procedure, recovery process, and timing considerations.  - Contact the office if knee pain significantly impacts daily activities.    CARDIOVASCULAR HEALTH:  - Continued Lipitor 40 mg daily (patient reports cutting 80 mg tablets in half).  - Continued Hygrotenn 25 mg daily.  - Continued aspirin 81 mg daily.    PREDIABETES MANAGEMENT:  - Recommend adopting a low sugar, plant-based Mediterranean diet to manage prediabetes and improve lipid profile.  - Recommend reducing carbohydrate and sugar intake.    ERECTILE DYSFUNCTION:  - Provided information on ED medication contraindications and potential interactions with cardiovascular medications.  - Provided prescription for Cialis or Viagra as needed, pending cardiologist approval.    COGNITIVE HEALTH:  - Educated on the natural cognitive decline process and the limited evidence for cognitive enhancement supplements.    DIET AND EXERCISE:  - Mr. Galindo to continue current exercise regimen, including ping pong and walking.  - Mr. Galindo to increase consumption of leafy greens and vegetables.    FOLLOW-UP:  - Follow up in 6-12months.       Jonas was seen today for leg pain, follow-up and prostate check.    Diagnoses and all orders for this visit:    Coronary artery disease involving native coronary artery of native heart without angina pectoris    Arthritis of knee    Prediabetes    Benign essential hypertension    Hyperlipidemia, unspecified hyperlipidemia type  -     atorvastatin (LIPITOR) 80 MG tablet; Take 0.5 tablets (40 mg total) by mouth once daily.    Other orders  -     tamsulosin (FLOMAX) 0.4 mg Cap; Take 1 capsule (0.4 mg total) by mouth once daily.  -     aspirin (ECOTRIN) 81 MG EC tablet; Take 1 tablet (81 mg total) by mouth once daily.  -     sildenafiL (VIAGRA) 50 MG  tablet; Take 1 tablet (50 mg total) by mouth daily as needed for Erectile Dysfunction.                Care Plan/Goals: Reviewed     Follow up: Follow up in about 1 year (around 12/9/2025).    After visit summary was printed and given to patient upon discharge today.  Patient goals and care plan are included in After Visit Summary.    This note was generated with the assistance of ambient listening technology. Verbal consent was obtained by the patient and accompanying visitor(s) for the recording of patient appointment to facilitate this note. I attest to having reviewed and edited the generated note for accuracy, though some syntax or spelling errors may persist. Please contact the author of this note for any clarification.

## 2024-12-20 DIAGNOSIS — M25.561 PAIN IN BOTH KNEES, UNSPECIFIED CHRONICITY: Primary | ICD-10-CM

## 2024-12-20 DIAGNOSIS — M25.562 PAIN IN BOTH KNEES, UNSPECIFIED CHRONICITY: Primary | ICD-10-CM

## 2025-01-09 ENCOUNTER — OFFICE VISIT (OUTPATIENT)
Dept: ORTHOPEDICS | Facility: CLINIC | Age: 65
End: 2025-01-09
Payer: OTHER GOVERNMENT

## 2025-01-09 ENCOUNTER — HOSPITAL ENCOUNTER (OUTPATIENT)
Dept: RADIOLOGY | Facility: HOSPITAL | Age: 65
Discharge: HOME | End: 2025-01-09
Attending: PHYSICIAN ASSISTANT
Payer: OTHER GOVERNMENT

## 2025-01-09 VITALS — WEIGHT: 223.75 LBS | HEIGHT: 72 IN | BODY MASS INDEX: 30.31 KG/M2

## 2025-01-09 DIAGNOSIS — M25.561 PAIN IN BOTH KNEES, UNSPECIFIED CHRONICITY: Primary | ICD-10-CM

## 2025-01-09 DIAGNOSIS — M25.562 PAIN IN BOTH KNEES, UNSPECIFIED CHRONICITY: ICD-10-CM

## 2025-01-09 DIAGNOSIS — M25.562 PAIN IN BOTH KNEES, UNSPECIFIED CHRONICITY: Primary | ICD-10-CM

## 2025-01-09 DIAGNOSIS — M17.0 PRIMARY OSTEOARTHRITIS OF BOTH KNEES: ICD-10-CM

## 2025-01-09 DIAGNOSIS — M25.561 PAIN IN BOTH KNEES, UNSPECIFIED CHRONICITY: ICD-10-CM

## 2025-01-09 PROCEDURE — 73564 X-RAY EXAM KNEE 4 OR MORE: CPT | Mod: 26,50,, | Performed by: RADIOLOGY

## 2025-01-09 PROCEDURE — 99999 PR PBB SHADOW E&M-EST. PATIENT-LVL IV: CPT | Mod: PBBFAC,,, | Performed by: PHYSICIAN ASSISTANT

## 2025-01-09 PROCEDURE — 73564 X-RAY EXAM KNEE 4 OR MORE: CPT | Mod: TC,50

## 2025-01-09 RX ORDER — LIDOCAINE HYDROCHLORIDE 10 MG/ML
5 INJECTION, SOLUTION INFILTRATION; PERINEURAL
Status: DISCONTINUED | OUTPATIENT
Start: 2025-01-09 | End: 2025-01-09 | Stop reason: HOSPADM

## 2025-01-09 RX ORDER — METHYLPREDNISOLONE ACETATE 80 MG/ML
80 INJECTION, SUSPENSION INTRA-ARTICULAR; INTRALESIONAL; INTRAMUSCULAR; SOFT TISSUE
Status: DISCONTINUED | OUTPATIENT
Start: 2025-01-09 | End: 2025-01-09 | Stop reason: HOSPADM

## 2025-01-09 RX ADMIN — LIDOCAINE HYDROCHLORIDE 5 ML: 10 INJECTION, SOLUTION INFILTRATION; PERINEURAL at 12:01

## 2025-01-09 RX ADMIN — METHYLPREDNISOLONE ACETATE 80 MG: 80 INJECTION, SUSPENSION INTRA-ARTICULAR; INTRALESIONAL; INTRAMUSCULAR; SOFT TISSUE at 12:01

## 2025-01-09 NOTE — PROCEDURES
Large Joint Aspiration/Injection: bilateral knee    Date/Time: 1/9/2025 12:00 PM    Performed by: Danielle Chadwick PA  Authorized by: Danielle Chadwick PA    Consent Done?:  Yes (Verbal)  Indications:  Arthritis, joint swelling and pain  Site marked: the procedure site was marked      Local anesthesia used?: Yes    Local anesthetic:  Topical anesthetic    Details:  Needle Size:  21 G  Approach:  Anterolateral  Location:  Knee  Laterality:  Bilateral  Site:  Bilateral knee  Medications (Right):  5 mL LIDOcaine HCL 10 mg/ml (1%) 10 mg/mL (1 %); 80 mg methylPREDNISolone acetate 80 mg/mL  Medications (Left):  5 mL LIDOcaine HCL 10 mg/ml (1%) 10 mg/mL (1 %); 80 mg methylPREDNISolone acetate 80 mg/mL  Patient tolerance:  Patient tolerated the procedure well with no immediate complications     Verbal consent was obtained  The patient's ID, site, side was verified  The site was sterile prepped in standard fashion  The injection was performed in the amy-lateral side without complication  A sterile Band-Aid was applied    Patient was directed to apply ice today at roughly 15 minutes at a time as needed.  It was discussed that they may be sore for the next few days or so.  Please avoid strenuous activity over the next 24 hours.  It was also discussed that the patient may have a increase in glucose if diabetic and should monitor levels.  Patient was instructed to call as needed.

## 2025-01-09 NOTE — PROGRESS NOTES
Patient ID: Jonas Galindo is a 64 y.o. male.    Chief Complaint: Pain of the Right Knee and Pain of the Right Femur      HPI: Jonas Galindo  is a 64 y.o. male who c/o Pain of the Right Knee and Pain of the Right Femur       Patient is a new patient who presents to me today with bilateral knee pain, left greater than right.  The patient states pain has been ongoing for roughly 20 years but increasing here over the last year as he has been slightly less active.  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  He states he would prefer not to take any medications is inclined with vitamins.  He states pain increases with use and activity over the course of the day has a hard time walking long distances, going from a sitting to standing or standing to seated position, unlevel terrain or stairs.  He has not using any devices to assist with ambulation.  He does have neoprene knee braces should he need.  He has not had any previous injections into the knees.    He denies any trauma or falls   Patient is presently denying any shortness of breath, chest pain, fever/chills, nausea/vomiting, loss of taste or smell, numbness/tingling or sensation changes, loss of bladder or bowel function.    1/9/25  Patient presents today for follow up bilateral knee pain.  Patient states pain is at worst a 10/10 affecting activity of daily living and thus his quality of life.  He states he was seeing good results with the steroid more so than the gel injection.  He was looking more into total knee replacements and feels he is getting closer and closer to being ready to undergo the surgery which we have discussed previously.  He has a hard time walking long distances, going from a sitting to standing or standing to seated position, unlevel terrain or stairs.  The patient is quite active in his daily routine works out several days a week.  He is fully independent of ADLs and drove himself to his  appointment today.  He does not use any devices or braces to assist with ambulation.      Patient is presently denying any shortness of breath, chest pain, fever/chills, nausea/vomiting, loss of taste or smell, numbness/tingling or sensation changes, loss of bladder or bowel function, loss of taste/smell.       Past Medical History:   Diagnosis Date    Heart disease     Had 2 stents placed       Past Surgical History:   Procedure Laterality Date    SPINE SURGERY  2015    TOTAL SHOULDER ARTHROPLASTY Right        No family history on file.    Social History     Socioeconomic History    Marital status:    Tobacco Use    Smoking status: Never    Smokeless tobacco: Former     Types: Chew   Substance and Sexual Activity    Alcohol use: Not Currently    Drug use: Never     Social Drivers of Health     Financial Resource Strain: Low Risk  (12/7/2024)    Overall Financial Resource Strain (CARDIA)     Difficulty of Paying Living Expenses: Not hard at all   Food Insecurity: No Food Insecurity (12/7/2024)    Hunger Vital Sign     Worried About Running Out of Food in the Last Year: Never true     Ran Out of Food in the Last Year: Never true   Transportation Needs: No Transportation Needs (11/28/2023)    PRAPARE - Transportation     Lack of Transportation (Medical): No     Lack of Transportation (Non-Medical): No   Physical Activity: Insufficiently Active (12/7/2024)    Exercise Vital Sign     Days of Exercise per Week: 3 days     Minutes of Exercise per Session: 20 min   Stress: No Stress Concern Present (12/7/2024)    [employer] - Occupational Stress Questionnaire     Feeling of Stress : Only a little   Housing Stability: Unknown (12/7/2024)    Housing Stability Vital Sign     Unable to Pay for Housing in the Last Year: No       Medication List with Changes/Refills   Current Medications    ASPIRIN (ASPIR-81 ORAL)        ASPIRIN (ECOTRIN) 81 MG EC TABLET    Take 1 tablet (81 mg total) by  mouth once daily.    ATORVASTATIN (LIPITOR) 80 MG TABLET    Take 0.5 tablets (40 mg total) by mouth once daily.    BECLOMETHASONE (QVAR REDIHALER) 80 MCG/ACTUATION INHALER    Inhale 1 puff into the lungs once daily.    CETIRIZINE (ZYRTEC) 10 MG TABLET    TAKE 1 TABLET BY MOUTH DAILY, THEN TWICE A DAY    CHLORTHALIDONE (HYGROTEN) 25 MG TAB    Take 1 tablet (25 mg total) by mouth once daily.    COENZYME Q10 200 MG/GRAM POWD    Take 200 mg by mouth every evening.    FLUTICASONE FUROATE NASL        MULTIVITAMIN WITH MINERALS TABLET    Take 1 tablet by mouth once daily.    OMEGA 3-DHA-EPA-FISH OIL (FISH OIL) 1,000 MG (120 MG-180 MG) CAP        PSYLLIUM (HYDROCIL) PACKET    Take 1 packet by mouth once daily.    SILDENAFIL (VIAGRA) 50 MG TABLET    Take 1 tablet (50 mg total) by mouth daily as needed for Erectile Dysfunction.    TAMSULOSIN (FLOMAX) 0.4 MG CAP    Take 1 capsule (0.4 mg total) by mouth once daily.    TURMERIC, BULK, 95 % POWD        ZINC GLUCONATE 50 MG TABLET    Take 50 mg by mouth.       Review of patient's allergies indicates:   Allergen Reactions    Oseltamivir Palpitations and Rash         Objective:      Lower Extremity      Left KNEE:  ROM: passive flex/ ext full  Patella midling, mild to moderate crepitus noted   Ligaments stable  Pain on palpation to medial and lateral  Calf NT, soft   No defect in the patella or quadriceps tendon  Patella tracks midline no dislocation noted  (-) Dustin sign  DF/PF full  Wiggles toes  Sensation intact to light touch   No pitting edema appreciated   NVI  Cap refill < 2 sec  Skin warm to touch, no obvious lesion noted     Right KNEE:  ROM: passive flex/ ext full  Patella midling, mild to moderate crepitus noted   No defect in the patellar quadriceps tendon  Ligaments stable  Pain on palpation to medial and lateral   Mild edema present  Calf NT, soft  (-) Dustin sign  DF/PF full  Wiggles toes  Sensation intact to light touch   No pitting edema appreciated   NVI  Cap  refill < 2 sec  Skin warm to touch, no obvious lesion noted       IMAGING:    XRAY:  FINDINGS:     Right knee, 3 views: Marked medial joint space narrowing.  Tricompartmental marginal spurring lateral tilt of the patella on the sunrise view.  No significant joint effusion.  Nonspecific periarticular calcifications noted adjacent to the medial aspect of the joint.  No fractures or dislocations.  No erosions.     Left knee, 3 views: Marked medial joint space narrowing.  Spurring of the patellofemoral compartment.  Lateral tilt of the patella.  No erosions.  No fractures.        Impression:   Degenerative joint disease with marked medial joint space narrowing bilaterally.  Lateral tilt of the patella bilaterally.    Kellgren Rambo scale : 3         Assessment:       Encounter Diagnoses   Name Primary?    Pain in both knees, unspecified chronicity Yes    Primary osteoarthritis of both knees           Plan:       Jonas was seen today for pain and pain.    Diagnoses and all orders for this visit:    Pain in both knees, unspecified chronicity    Primary osteoarthritis of both knees  -     Prior authorization Order  -     Large Joint Aspiration/Injection: bilateral knee        Jonas Galindo presents today with bilateral knee pain. We had a long discussion today regarding degenerative arthritis in the knees. The patient understands that arthritis is chronic and will worsen over time.  The patient also understands that arthritis may cause episodic flare-ups in pain. Management or if arthritis is achieved through a multi-modal approach including weight loss in obese individuals, activity modification, NSAIDs (topical vs oral) where appropriate, periodic intra-articular steroid injections, viscosupplementation, physical therapy, knee bracing, ambulatory aids, as well as geniculate nerve blocks.  The patient wishes not to proceed with any gel injections as he does feel the steroid injections provide better relief.  We  discussed today moving forward with long-acting steroid injections in the form of Zilretta which I would like to get him approved for until he is ready for surgery.  Additionally we discussed preprocedure, procedural ongoing, postprocedure of total knee replacement.  He elected today to move forward with short-acting steroids into both knees.  Ask that he refrain from soaking in standing water such as a pool or tub.  He may advance activity as tolerated with elevation and ice as needed.  I would like to see him back in 3 months for further evaluation and possible Zilretta injections.  If he changes his mind and would like to move forward with surgery ask that he give us a call and we will pursue this as well.  May call with any questions or concerns in the interim.    Patient verbalized understanding of all instructions and agreed with the above plan.    No follow-ups on file.    The patient understands, chooses and consents to this plan and accepts all   the risks which include but are not limited to the risks mentioned above.     Disclaimer: This note was prepared using a voice recognition system and is likely to have sound alike errors within the text.

## 2025-02-08 DIAGNOSIS — I10 PRIMARY HYPERTENSION: ICD-10-CM

## 2025-02-10 RX ORDER — CHLORTHALIDONE 25 MG/1
25 TABLET ORAL
Qty: 90 TABLET | Refills: 3 | Status: SHIPPED | OUTPATIENT
Start: 2025-02-10

## 2025-02-10 NOTE — TELEPHONE ENCOUNTER
Refill Routing Note   Medication(s) are not appropriate for processing by [facility] for the following reason(s):        Outside of protocol  No active prescription written by provider    [facility] action(s):  Route               Appointments  past 12m or future 3m with PCP    Date Provider   Last Visit   12/9/2024 Lala Roa MD   Next Visit   Visit date not found Lala Roa MD   ED visits in past 90 days: 0        Note composed:11:07 AM 02/10/2025

## 2025-03-31 ENCOUNTER — PATIENT MESSAGE (OUTPATIENT)
Dept: ORTHOPEDICS | Facility: CLINIC | Age: 65
End: 2025-03-31
Payer: COMMERCIAL

## 2025-04-04 DIAGNOSIS — J45.20 MILD INTERMITTENT ASTHMA, UNSPECIFIED WHETHER COMPLICATED: ICD-10-CM

## 2025-04-04 RX ORDER — BECLOMETHASONE DIPROPIONATE HFA 80 UG/1
1 AEROSOL, METERED RESPIRATORY (INHALATION) DAILY
Qty: 30.6 G | Refills: 2 | Status: SHIPPED | OUTPATIENT
Start: 2025-04-04

## 2025-04-04 NOTE — TELEPHONE ENCOUNTER
Refill Routing Note   Medication(s) are not appropriate for processing by [facility] for the following reason(s):        No active prescription written by provider    [facility] action(s):  Defer               Appointments  past 12m or future 3m with PCP    Date Provider   Last Visit   12/9/2024 Lala Roa MD   Next Visit   Visit date not found Lala Roa MD   ED visits in past 90 days: 0        Note composed:2:26 PM 04/04/2025

## 2025-04-04 NOTE — TELEPHONE ENCOUNTER
No care due was identified.  [facility] Embedded Care Due Messages. Reference number: [id number].   4/04/2025 2:26:49 PM CDT

## 2025-04-10 ENCOUNTER — TELEPHONE (OUTPATIENT)
Dept: ORTHOPEDICS | Facility: CLINIC | Age: 65
End: 2025-04-10
Payer: COMMERCIAL

## 2025-04-10 NOTE — TELEPHONE ENCOUNTER
Spoke w/ pt about pending auth for Zilretta and agreed to [facility] his apt to 4/16 and call him if it is not approved.

## 2025-04-16 ENCOUNTER — PATIENT MESSAGE (OUTPATIENT)
Dept: ORTHOPEDICS | Facility: CLINIC | Age: 65
End: 2025-04-16
Payer: COMMERCIAL

## 2025-04-22 ENCOUNTER — TELEPHONE (OUTPATIENT)
Dept: ORTHOPEDICS | Facility: CLINIC | Age: 65
End: 2025-04-22
Payer: COMMERCIAL

## 2025-04-22 NOTE — TELEPHONE ENCOUNTER
Spoke w/ pt to inform him ins denied zilretta inj and voiced verbal understanding. Denies wanting sx and will proceed w/ short acting steroid inj.

## 2025-04-25 ENCOUNTER — PROCEDURE VISIT (OUTPATIENT)
Dept: ORTHOPEDICS | Facility: CLINIC | Age: 65
End: 2025-04-25
Payer: COMMERCIAL

## 2025-04-25 VITALS
WEIGHT: 223 LBS | HEART RATE: 64 BPM | SYSTOLIC BLOOD PRESSURE: 129 MMHG | BODY MASS INDEX: 30.2 KG/M2 | DIASTOLIC BLOOD PRESSURE: 82 MMHG | HEIGHT: 72 IN

## 2025-04-25 DIAGNOSIS — M25.561 PAIN IN BOTH KNEES, UNSPECIFIED CHRONICITY: Primary | ICD-10-CM

## 2025-04-25 DIAGNOSIS — M25.562 PAIN IN BOTH KNEES, UNSPECIFIED CHRONICITY: Primary | ICD-10-CM

## 2025-04-25 DIAGNOSIS — M17.0 PRIMARY OSTEOARTHRITIS OF BOTH KNEES: ICD-10-CM

## 2025-04-25 RX ORDER — LIDOCAINE HYDROCHLORIDE 10 MG/ML
5 INJECTION, SOLUTION INFILTRATION; PERINEURAL
Status: DISCONTINUED | OUTPATIENT
Start: 2025-04-25 | End: 2025-04-25 | Stop reason: HOSPADM

## 2025-04-25 RX ORDER — METHYLPREDNISOLONE ACETATE 80 MG/ML
80 INJECTION, SUSPENSION INTRA-ARTICULAR; INTRALESIONAL; INTRAMUSCULAR; SOFT TISSUE
Status: DISCONTINUED | OUTPATIENT
Start: 2025-04-25 | End: 2025-04-25 | Stop reason: HOSPADM

## 2025-04-25 RX ADMIN — METHYLPREDNISOLONE ACETATE 80 MG: 80 INJECTION, SUSPENSION INTRA-ARTICULAR; INTRALESIONAL; INTRAMUSCULAR; SOFT TISSUE at 11:04

## 2025-04-25 RX ADMIN — LIDOCAINE HYDROCHLORIDE 5 ML: 10 INJECTION, SOLUTION INFILTRATION; PERINEURAL at 11:04

## 2025-04-25 NOTE — PROCEDURES
Large Joint Aspiration/Injection: bilateral knee    Date/Time: 4/25/2025 11:00 AM    Performed by: Danielle Chadwick PA  Authorized by: Danielle Chadwick PA    Consent Done?:  Yes (Verbal)  Indications:  Pain, arthritis and joint swelling  Site marked: the procedure site was marked      Local anesthesia used?: Yes    Local anesthetic:  Topical anesthetic    Details:  Needle Size:  22 G  Approach:  Anterolateral  Location:  Knee  Laterality:  Bilateral  Site:  Bilateral knee  Medications (Right):  5 mL LIDOcaine HCL 10 mg/ml (1%) 10 mg/mL (1 %); 80 mg methylPREDNISolone acetate 80 mg/mL  Medications (Left):  5 mL LIDOcaine HCL 10 mg/ml (1%) 10 mg/mL (1 %); 80 mg methylPREDNISolone acetate 80 mg/mL  Patient tolerance:  Patient tolerated the procedure well with no immediate complications     Verbal consent was obtained  The patient's ID, site, side was verified  The site was sterile prepped in standard fashion  The injection was performed in the amy-lateral side without complication  A sterile Band-Aid was applied    Patient was directed to apply ice today at roughly 15 minutes at a time as needed.  It was discussed that they may be sore for the next few days or so.  Please avoid strenuous activity over the next 24 hours.  It was also discussed that the patient may have a increase in glucose if diabetic and should monitor levels.  Patient was instructed to call as needed.

## 2025-06-09 ENCOUNTER — PATIENT MESSAGE (OUTPATIENT)
Dept: ORTHOPEDICS | Facility: CLINIC | Age: 65
End: 2025-06-09
Payer: COMMERCIAL

## 2025-06-10 ENCOUNTER — OFFICE VISIT (OUTPATIENT)
Dept: OPHTHALMOLOGY | Facility: CLINIC | Age: 65
End: 2025-06-10
Payer: COMMERCIAL

## 2025-06-10 DIAGNOSIS — H52.13 MYOPIA WITH ASTIGMATISM AND PRESBYOPIA, BILATERAL: ICD-10-CM

## 2025-06-10 DIAGNOSIS — D31.32 CHOROIDAL NEVUS OF LEFT EYE: ICD-10-CM

## 2025-06-10 DIAGNOSIS — R73.03 PREDIABETES: Primary | ICD-10-CM

## 2025-06-10 DIAGNOSIS — H25.813 COMBINED FORM OF AGE-RELATED CATARACT, BOTH EYES: ICD-10-CM

## 2025-06-10 DIAGNOSIS — H52.203 MYOPIA WITH ASTIGMATISM AND PRESBYOPIA, BILATERAL: ICD-10-CM

## 2025-06-10 DIAGNOSIS — H52.4 MYOPIA WITH ASTIGMATISM AND PRESBYOPIA, BILATERAL: ICD-10-CM

## 2025-06-10 PROCEDURE — 92015 DETERMINE REFRACTIVE STATE: CPT | Mod: S$GLB,,, | Performed by: OPTOMETRIST

## 2025-06-10 PROCEDURE — 92004 COMPRE OPH EXAM NEW PT 1/>: CPT | Mod: S$GLB,,, | Performed by: OPTOMETRIST

## 2025-06-10 PROCEDURE — 2023F DILAT RTA XM W/O RTNOPTHY: CPT | Mod: CPTII,S$GLB,, | Performed by: OPTOMETRIST

## 2025-06-10 PROCEDURE — 99999 PR PBB SHADOW E&M-EST. PATIENT-LVL III: CPT | Mod: PBBFAC,,, | Performed by: OPTOMETRIST

## 2025-06-10 PROCEDURE — 1160F RVW MEDS BY RX/DR IN RCRD: CPT | Mod: CPTII,S$GLB,, | Performed by: OPTOMETRIST

## 2025-06-10 PROCEDURE — 1159F MED LIST DOCD IN RCRD: CPT | Mod: CPTII,S$GLB,, | Performed by: OPTOMETRIST

## 2025-06-10 NOTE — PROGRESS NOTES
HPI     Annual Exam            Comments: Vision changes since last eye exam?: yes, pt states he can't   see wel    Any eye pain today: no    Other ocular symptoms: ocular migraines and floaters.    Interested in contact lens fitting today? no                     Comments    1. Pre-diabetic    Lab Results       Component                Value               Date                       HGBA1C                   5.8 (H)             11/26/2024                     Last edited by Bernard Simon, OD on 6/10/2025  2:59 PM.            Assessment /Plan     For exam results, see Encounter Report.    1. Prediabetes  Last A1c 5.8 There was no diabetic retinopathy present on either eye on examination today. Recommend good blood pressure control, strict blood glucose control, and good cholesterol control.  Continue close care with Dr. Roa regarding diabetes.     2. Choroidal nevus of left eye  Recommend baseline optos for surveillance.     3. Combined form of age-related cataract, both eyes  Cataracts are not visually significant and not affecting activities of daily living. Annual observation is recommended at this time. Patient to call or return to clinic with any significant change in vision prior to next visit.     4. Myopia with astigmatism and presbyopia, bilateral  Eyeglass Final Rx       Eyeglass Final Rx         Sphere Cylinder Axis Add    Right -1.25 +2.50 021 +2.50    Left -0.25 +1.25 170 +2.50      Type: PAL or SVL    Expiration Date: 6/10/2026              Eyeglass Final Rx #2         Sphere Cylinder Axis Add    Right [place] +2.50 021     Left +1.00 +1.25 170       Type: Computer Only    Expiration Date: 6/10/2026                    RTC at [facility] for optos due to nevus.

## 2025-07-02 ENCOUNTER — OFFICE VISIT (OUTPATIENT)
Dept: OPHTHALMOLOGY | Facility: CLINIC | Age: 65
End: 2025-07-02
Payer: COMMERCIAL

## 2025-07-02 DIAGNOSIS — H25.813 COMBINED FORM OF AGE-RELATED CATARACT, BOTH EYES: ICD-10-CM

## 2025-07-02 DIAGNOSIS — H52.13 MYOPIA WITH ASTIGMATISM AND PRESBYOPIA, BILATERAL: ICD-10-CM

## 2025-07-02 DIAGNOSIS — H52.4 MYOPIA WITH ASTIGMATISM AND PRESBYOPIA, BILATERAL: ICD-10-CM

## 2025-07-02 DIAGNOSIS — D31.32 CHOROIDAL NEVUS OF LEFT EYE: Primary | ICD-10-CM

## 2025-07-02 DIAGNOSIS — R73.03 PREDIABETES: ICD-10-CM

## 2025-07-02 DIAGNOSIS — H52.203 MYOPIA WITH ASTIGMATISM AND PRESBYOPIA, BILATERAL: ICD-10-CM

## 2025-07-02 PROCEDURE — 99999 PR PBB SHADOW E&M-EST. PATIENT-LVL I: CPT | Mod: PBBFAC,,, | Performed by: OPTOMETRIST

## 2025-07-02 PROCEDURE — 99499 UNLISTED E&M SERVICE: CPT | Mod: S$GLB,,, | Performed by: OPTOMETRIST

## 2025-07-02 NOTE — PROGRESS NOTES
HPI     Follow-up            Comments:     RTC at [facility] for optos due to nevus.             Comments       1. Pre-diabetic          Last edited by Sydni Garcia on 7/2/2025  1:42 PM.            Assessment /Plan     For exam results, see Encounter Report.    1. Choroidal nevus of left eye  Baseline Optos done today.  Observe q6m with DFE and optos.     2. Prediabetes  Observe.    3. Combined form of age-related cataract, both eyes  NVS, observe.     4. Myopia with astigmatism and presbyopia, bilateral  Eyeglass Final Rx       Eyeglass Final Rx         Sphere Cylinder Axis Add    Right [place] +2.50 021     Left +1.00 +1.25 170       Type: Computer Only    Expiration Date: 7/2/2026              Eyeglass Final Rx #2         Sphere Cylinder Axis Add    Right -1.25 +2.50 021 +2.50    Left -0.25 +1.25 170 +2.50      Type: PAL or SVL    Expiration Date: 7/2/2026              Eyeglass Final Rx Comments    PD 64                   RTC in 6 months for DFE with optos.   Discussed above and answered questions.

## 2025-07-25 ENCOUNTER — PROCEDURE VISIT (OUTPATIENT)
Dept: ORTHOPEDICS | Facility: CLINIC | Age: 65
End: 2025-07-25
Payer: COMMERCIAL

## 2025-07-25 VITALS
DIASTOLIC BLOOD PRESSURE: 75 MMHG | SYSTOLIC BLOOD PRESSURE: 131 MMHG | BODY MASS INDEX: 30.2 KG/M2 | WEIGHT: 223 LBS | HEIGHT: 72 IN | HEART RATE: 67 BPM

## 2025-07-25 DIAGNOSIS — M17.0 PRIMARY OSTEOARTHRITIS OF BOTH KNEES: Primary | ICD-10-CM

## 2025-07-25 DIAGNOSIS — M25.562 PAIN IN BOTH KNEES, UNSPECIFIED CHRONICITY: ICD-10-CM

## 2025-07-25 DIAGNOSIS — M25.561 PAIN IN BOTH KNEES, UNSPECIFIED CHRONICITY: ICD-10-CM

## 2025-07-25 RX ORDER — LIDOCAINE HYDROCHLORIDE 10 MG/ML
5 INJECTION, SOLUTION INFILTRATION; PERINEURAL
Status: DISCONTINUED | OUTPATIENT
Start: 2025-07-25 | End: 2025-07-25 | Stop reason: HOSPADM

## 2025-07-25 RX ORDER — METHYLPREDNISOLONE ACETATE 80 MG/ML
80 INJECTION, SUSPENSION INTRA-ARTICULAR; INTRALESIONAL; INTRAMUSCULAR; SOFT TISSUE
Status: DISCONTINUED | OUTPATIENT
Start: 2025-07-25 | End: 2025-07-25 | Stop reason: HOSPADM

## 2025-07-25 RX ADMIN — LIDOCAINE HYDROCHLORIDE 5 ML: 10 INJECTION, SOLUTION INFILTRATION; PERINEURAL at 12:07

## 2025-07-25 RX ADMIN — METHYLPREDNISOLONE ACETATE 80 MG: 80 INJECTION, SUSPENSION INTRA-ARTICULAR; INTRALESIONAL; INTRAMUSCULAR; SOFT TISSUE at 12:07

## 2025-07-25 NOTE — PROGRESS NOTES
Patient ID: Jonas Galindo is a 64 y.o. male.    Chief Complaint: Pain of the Left Knee and Pain of the Right Knee      HPI: Jonas Galindo  is a 64 y.o. male who c/o Pain of the Left Knee and Pain of the Right Knee       Patient is a new patient who presents to me today with bilateral knee pain, left greater than right.  The patient states pain has been ongoing for roughly 20 years but increasing here over the last year as he has been slightly less active.  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  He states he would prefer not to take any medications is inclined with vitamins.  He states pain increases with use and activity over the course of the day has a hard time walking long distances, going from a sitting to standing or standing to seated position, unlevel terrain or stairs.  He has not using any devices to assist with ambulation.  He does have neoprene knee braces should he need.  He has not had any previous injections into the knees.    He denies any trauma or falls   Patient is presently denying any shortness of breath, chest pain, fever/chills, nausea/vomiting, loss of taste or smell, numbness/tingling or sensation changes, loss of bladder or bowel function.    1/9/25  Patient presents today for follow up bilateral knee pain.  Patient states pain is at worst a 10/10 affecting activity of daily living and thus his quality of life.  He states he was seeing good results with the steroid more so than the gel injection.  He was looking more into total knee replacements and feels he is getting closer and closer to being ready to undergo the surgery which we have discussed previously.  He has a hard time walking long distances, going from a sitting to standing or standing to seated position, unlevel terrain or stairs.  The patient is quite active in his daily routine works out several days a week.  He is fully independent of ADLs and drove himself to his  appointment today.  He does not use any devices or braces to assist with ambulation.      Patient is presently denying any shortness of breath, chest pain, fever/chills, nausea/vomiting, loss of taste or smell, numbness/tingling or sensation changes, loss of bladder or bowel function, loss of taste/smell.     7/25/25    Patient presents today for follow up bilateral knee pain, right greater than left.  Patient notes pain is at worst an 8/10 affecting activity of daily living and thus his quality of life.  The patient states he is seeing better results with the short-acting steroid than the Durolane although they seem to be lasting less than last.  He states he gets about 8 weeks of great relief and then it starts to wear off.  He is looking to move forward with right total knee replacement but has to coordinate some things with work as he drives a lot out of town.  He presents today for evaluation hoping to receive repeat short-acting steroid injection     He denies any trauma or falls   He is not using any devices to assist with ambulation   Patient is presently denying any shortness of breath, chest pain, fever/chills, nausea/vomiting, loss of taste or smell, numbness/tingling or sensation changes, loss of bladder or bowel function, loss of taste/smell.     Past Medical History:   Diagnosis Date    Heart disease     Had 2 stents placed       Past Surgical History:   Procedure Laterality Date    SPINE SURGERY  2015    TOTAL SHOULDER ARTHROPLASTY Right        No family history on file.    Social History     Socioeconomic History    Marital status:    Tobacco Use    Smoking status: Never    Smokeless tobacco: Former     Types: Chew   Substance and Sexual Activity    Alcohol use: Not Currently    Drug use: Never     Social Drivers of Health     Financial Resource Strain: Low Risk  (12/7/2024)    Overall Financial Resource Strain (CARDIA)     Difficulty of Paying Living Expenses: Not hard at all   Food  Insecurity: No Food Insecurity (12/7/2024)    Hunger Vital Sign     Worried About Running Out of Food in the Last Year: Never true     Ran Out of Food in the Last Year: Never true   Transportation Needs: No Transportation Needs (11/28/2023)    PRAPARE - Transportation     Lack of Transportation (Medical): No     Lack of Transportation (Non-Medical): No   Physical Activity: Insufficiently Active (12/7/2024)    Exercise Vital Sign     Days of Exercise per Week: 3 days     Minutes of Exercise per Session: 20 min   Stress: No Stress Concern Present (12/7/2024)    [employer] [place] [employer] - Occupational Stress Questionnaire     Feeling of Stress : Only a little   Housing Stability: Unknown (12/7/2024)    Housing Stability Vital Sign     Unable to Pay for Housing in the Last Year: No       Medication List with Changes/Refills   Current Medications    ASPIRIN (ASPIR-81 ORAL)        ASPIRIN (ECOTRIN) 81 MG EC TABLET    Take 1 tablet (81 mg total) by mouth once daily.    ATORVASTATIN (LIPITOR) 80 MG TABLET    Take 0.5 tablets (40 mg total) by mouth once daily.    BECLOMETHASONE (QVAR REDIHALER) 80 MCG/ACTUATION INHALER    Inhale 1 puff into the lungs once daily.    CETIRIZINE (ZYRTEC) 10 MG TABLET    TAKE 1 TABLET BY MOUTH DAILY, THEN TWICE A DAY    CHLORTHALIDONE (HYGROTEN) 25 MG TAB    TAKE 1 TABLET BY MOUTH EVERY DAY    COENZYME Q10 200 MG/GRAM POWD    Take 200 mg by mouth every evening.    FLUTICASONE FUROATE NASL        MULTIVITAMIN WITH MINERALS TABLET    Take 1 tablet by mouth once daily.    OMEGA 3-DHA-EPA-FISH OIL (FISH OIL) 1,000 MG (120 MG-180 MG) CAP        PSYLLIUM (HYDROCIL) PACKET    Take 1 packet by mouth once daily.    SILDENAFIL (VIAGRA) 50 MG TABLET    Take 1 tablet (50 mg total) by mouth daily as needed for Erectile Dysfunction.    TAMSULOSIN (FLOMAX) 0.4 MG CAP    Take 1 capsule (0.4 mg total) by mouth once daily.    TURMERIC, BULK, 95 % POWD        ZINC GLUCONATE 50 MG TABLET    Take 50  mg by mouth.       Review of patient's allergies indicates:   Allergen Reactions    Oseltamivir Palpitations and Rash         Objective:      Lower Extremity      Left KNEE:  ROM: passive flex/ ext full  Patella midling, mild to moderate crepitus noted   Ligaments stable  Pain on palpation to medial and lateral  Calf NT, soft   No defect in the patella or quadriceps tendon  Patella tracks midline no dislocation noted  (-) Dustin sign  DF/PF full  Wiggles toes  Sensation intact to light touch   No pitting edema appreciated   NVI  Cap refill < 2 sec  Skin warm to touch, no obvious lesion noted     Right KNEE:  ROM: passive flex/ ext full  Patella midling, mild to moderate crepitus noted   No defect in the patellar quadriceps tendon  Ligaments stable  Pain on palpation to medial and lateral   Mild edema present  Calf NT, soft  (-) Dustin sign  DF/PF full  Wiggles toes  Sensation intact to light touch   No pitting edema appreciated   NVI  Cap refill < 2 sec  Skin warm to touch, no obvious lesion noted       IMAGING:    XRAY:  FINDINGS:     Right knee, 3 views: Marked medial joint space narrowing.  Tricompartmental marginal spurring lateral tilt of the patella on the sunrise view.  No significant joint effusion.  Nonspecific periarticular calcifications noted adjacent to the medial aspect of the joint.  No fractures or dislocations.  No erosions.     Left knee, 3 views: Marked medial joint space narrowing.  Spurring of the patellofemoral compartment.  Lateral tilt of the patella.  No erosions.  No fractures.        Impression:   Degenerative joint disease with marked medial joint space narrowing bilaterally.  Lateral tilt of the patella bilaterally.    Kellgren Rambo scale : 3         Assessment:       No diagnosis found.         Plan:       There are no diagnoses linked to this encounter.      Jonas Mateo presents today with bilateral knee pain. We had a long discussion today regarding degenerative arthritis in  the knees. The patient understands that arthritis is chronic and will worsen over time.  The patient also understands that arthritis may cause episodic flare-ups in pain. Management or if arthritis is achieved through a multi-modal approach including weight loss in obese individuals, activity modification, NSAIDs (topical vs oral) where appropriate, periodic intra-articular steroid injections, viscosupplementation, physical therapy, knee bracing, ambulatory aids, as well as geniculate nerve blocks.  The patient elected to proceed with bilateral knee short-acting steroid injections today.  Ask that he refrain from soaking in standing water such as a pool or tub for 24 hours.  He may advance activity as tolerated with elevation and ice as needed.  The patient is potentially going to move forward with right knee replacement.  I discussed with him that receiving an injection today with delay surgery for 3 months.  He has an appointment to obtain surgical clearance from Cardiology as well as a further appointment with Dr. Menjivar.  If he wishes to additionally obtain short-acting steroids repetitively on the left we may do so.  He may call with any questions or concerns in the interim.  Patient verbalized understanding of all instructions and agreed with the above plan.    No follow-ups on file.    The patient understands, chooses and consents to this plan and accepts all   the risks which include but are not limited to the risks mentioned above.     Disclaimer: This note was prepared using a voice recognition system and is likely to have sound alike errors within the text.

## 2025-07-25 NOTE — PROCEDURES
Large Joint Aspiration/Injection: bilateral knee    Date/Time: 7/25/2025 12:00 PM    Performed by: Danielle Chadwick PA  Authorized by: Danielle Chadwick PA    Site marked: the procedure site was marked    Timeout: prior to procedure the correct patient, procedure, and site was verified      Local anesthesia used?: Yes    Local anesthetic:  Topical anesthetic    Details:  Needle Size:  21 G  Approach:  Anterolateral  Location:  Knee  Laterality:  Bilateral  Site:  Bilateral knee  Medications (Right):  5 mL LIDOcaine HCL 10 mg/ml (1%) 10 mg/mL (1 %); 80 mg methylPREDNISolone acetate 80 mg/mL  Medications (Left):  5 mL LIDOcaine HCL 10 mg/ml (1%) 10 mg/mL (1 %); 80 mg methylPREDNISolone acetate 80 mg/mL  Patient tolerance:  Patient tolerated the procedure well with no immediate complications     Verbal consent was obtained  The patient's ID, site, side was verified  The site was sterile prepped in standard fashion  The injection was performed in the amy-lateral side without complication  A sterile Band-Aid was applied    Patient was directed to apply ice today at roughly 15 minutes at a time as needed.     If steroid was received today:   It was discussed that they may be sore for the next few days or week   Please avoid strenuous activity over the next 24 hours.   Effectiveness may take 1-2 weeks to show results especially with slow release medication  It was also discussed that the patient may have a increase in glucose if diabetic and should monitor levels.  Patient may also have a rise in blood pressure, monitor symptoms     If gel was received today:  Patient may be sore for 14 days with intermittent swelling  Avoid heavy activity, elevate and ice as need  This will take up to 8 weeks to show full effectiveness  Patient was instructed to call as needed.

## 2025-08-29 ENCOUNTER — TELEPHONE (OUTPATIENT)
Dept: ORTHOPEDICS | Facility: CLINIC | Age: 65
End: 2025-08-29
Payer: COMMERCIAL